# Patient Record
Sex: FEMALE | Race: BLACK OR AFRICAN AMERICAN
[De-identification: names, ages, dates, MRNs, and addresses within clinical notes are randomized per-mention and may not be internally consistent; named-entity substitution may affect disease eponyms.]

---

## 2020-08-27 ENCOUNTER — HOSPITAL ENCOUNTER (INPATIENT)
Dept: HOSPITAL 35 - ER | Age: 80
LOS: 5 days | Discharge: NURSING, REHAB OR LONG-TERM CARE | DRG: 871 | End: 2020-09-01
Attending: INTERNAL MEDICINE | Admitting: INTERNAL MEDICINE
Payer: COMMERCIAL

## 2020-08-27 VITALS
SYSTOLIC BLOOD PRESSURE: 179 MMHG | DIASTOLIC BLOOD PRESSURE: 73 MMHG | WEIGHT: 145 LBS | BODY MASS INDEX: 27.38 KG/M2 | HEIGHT: 60.98 IN

## 2020-08-27 DIAGNOSIS — G47.30: ICD-10-CM

## 2020-08-27 DIAGNOSIS — Z85.038: ICD-10-CM

## 2020-08-27 DIAGNOSIS — Z79.899: ICD-10-CM

## 2020-08-27 DIAGNOSIS — R63.4: ICD-10-CM

## 2020-08-27 DIAGNOSIS — B96.89: ICD-10-CM

## 2020-08-27 DIAGNOSIS — Z90.49: ICD-10-CM

## 2020-08-27 DIAGNOSIS — G47.00: ICD-10-CM

## 2020-08-27 DIAGNOSIS — I50.33: ICD-10-CM

## 2020-08-27 DIAGNOSIS — E11.51: ICD-10-CM

## 2020-08-27 DIAGNOSIS — Z79.4: ICD-10-CM

## 2020-08-27 DIAGNOSIS — E03.9: ICD-10-CM

## 2020-08-27 DIAGNOSIS — N39.0: ICD-10-CM

## 2020-08-27 DIAGNOSIS — Z88.8: ICD-10-CM

## 2020-08-27 DIAGNOSIS — E11.22: ICD-10-CM

## 2020-08-27 DIAGNOSIS — A41.9: Primary | ICD-10-CM

## 2020-08-27 DIAGNOSIS — E78.00: ICD-10-CM

## 2020-08-27 DIAGNOSIS — I13.0: ICD-10-CM

## 2020-08-27 DIAGNOSIS — N17.0: ICD-10-CM

## 2020-08-27 DIAGNOSIS — G92: ICD-10-CM

## 2020-08-27 DIAGNOSIS — D50.9: ICD-10-CM

## 2020-08-27 DIAGNOSIS — E53.8: ICD-10-CM

## 2020-08-27 DIAGNOSIS — N18.3: ICD-10-CM

## 2020-08-27 LAB
ANION GAP SERPL CALC-SCNC: 8 MMOL/L (ref 7–16)
BUN SERPL-MCNC: 57 MG/DL (ref 7–18)
CALCIUM SERPL-MCNC: 9.3 MG/DL (ref 8.5–10.1)
CHLORIDE SERPL-SCNC: 103 MMOL/L (ref 98–107)
CO2 SERPL-SCNC: 25 MMOL/L (ref 21–32)
CREAT SERPL-MCNC: 2.5 MG/DL (ref 0.6–1)
ERYTHROCYTE [DISTWIDTH] IN BLOOD BY AUTOMATED COUNT: 13.2 % (ref 10.5–14.5)
GLUCOSE SERPL-MCNC: 230 MG/DL (ref 74–106)
HCT VFR BLD CALC: 30.1 % (ref 37–47)
HGB BLD-MCNC: 10.3 GM/DL (ref 12–15)
MCH RBC QN AUTO: 31.9 PG (ref 26–34)
MCHC RBC AUTO-ENTMCNC: 34.3 G/DL (ref 28–37)
MCV RBC: 93 FL (ref 80–100)
PLATELET # BLD: 194 THOU/UL (ref 150–400)
POTASSIUM SERPL-SCNC: 5.1 MMOL/L (ref 3.5–5.1)
RBC # BLD AUTO: 3.23 MIL/UL (ref 4.2–5)
SODIUM SERPL-SCNC: 136 MMOL/L (ref 136–145)
WBC # BLD AUTO: 6.1 THOU/UL (ref 4–11)

## 2020-08-27 PROCEDURE — 10045: CPT

## 2020-08-28 VITALS — DIASTOLIC BLOOD PRESSURE: 83 MMHG | SYSTOLIC BLOOD PRESSURE: 179 MMHG

## 2020-08-28 VITALS — SYSTOLIC BLOOD PRESSURE: 147 MMHG | DIASTOLIC BLOOD PRESSURE: 61 MMHG

## 2020-08-28 VITALS — DIASTOLIC BLOOD PRESSURE: 68 MMHG | SYSTOLIC BLOOD PRESSURE: 171 MMHG

## 2020-08-28 VITALS — SYSTOLIC BLOOD PRESSURE: 143 MMHG | DIASTOLIC BLOOD PRESSURE: 52 MMHG

## 2020-08-28 LAB
ALBUMIN SERPL-MCNC: 3.3 G/DL (ref 3.4–5)
ALT SERPL-CCNC: 19 U/L (ref 30–65)
AST SERPL-CCNC: 18 U/L (ref 15–37)
BACTERIA-REFLEX: (no result) /HPF
BILIRUB DIRECT SERPL-MCNC: < 0.1 MG/DL
BILIRUB SERPL-MCNC: 0.3 MG/DL (ref 0.2–1)
BILIRUB UR-MCNC: NEGATIVE MG/DL
COLOR UR: YELLOW
EOSINOPHIL NFR BLD: 3 % (ref 0–3)
GRANULOCYTES NFR BLD MANUAL: 52 % (ref 36–66)
KETONES UR STRIP-MCNC: NEGATIVE MG/DL
LYMPHOCYTES NFR BLD AUTO: 32 % (ref 24–44)
MONOCYTES NFR BLD: 13 % (ref 1–8)
NEUTROPHILS # BLD: 3.2 THOU/UL (ref 1.4–8.2)
PLATELET # BLD EST: NORMAL 10*3/UL
PROT SERPL-MCNC: 7.3 G/DL (ref 6.4–8.2)
RBC # UR STRIP: (no result) /UL
RBC #/AREA URNS HPF: (no result) /HPF (ref 0–2)
RBC MORPH BLD: NORMAL
SP GR UR STRIP: 1.01 (ref 1–1.03)
URINE CLARITY: CLEAR
URINE GLUCOSE-RANDOM*: (no result)
URINE LEUKOCYTES-REFLEX: (no result)
URINE NITRITE-REFLEX: NEGATIVE
URINE PROTEIN (DIPSTICK): (no result)
URINE WBC-REFLEX: (no result) /HPF (ref 0–5)
UROBILINOGEN UR STRIP-ACNC: 0.2 E.U./DL (ref 0.2–1)

## 2020-08-29 VITALS — SYSTOLIC BLOOD PRESSURE: 106 MMHG | DIASTOLIC BLOOD PRESSURE: 39 MMHG

## 2020-08-29 VITALS — DIASTOLIC BLOOD PRESSURE: 47 MMHG | SYSTOLIC BLOOD PRESSURE: 133 MMHG

## 2020-08-29 VITALS — DIASTOLIC BLOOD PRESSURE: 51 MMHG | SYSTOLIC BLOOD PRESSURE: 118 MMHG

## 2020-08-29 VITALS — SYSTOLIC BLOOD PRESSURE: 87 MMHG | DIASTOLIC BLOOD PRESSURE: 32 MMHG

## 2020-08-29 LAB
ANION GAP SERPL CALC-SCNC: 8 MMOL/L (ref 7–16)
BUN SERPL-MCNC: 49 MG/DL (ref 7–18)
CALCIUM SERPL-MCNC: 9.1 MG/DL (ref 8.5–10.1)
CHLORIDE SERPL-SCNC: 106 MMOL/L (ref 98–107)
CO2 SERPL-SCNC: 26 MMOL/L (ref 21–32)
CREAT SERPL-MCNC: 2.1 MG/DL (ref 0.6–1)
ERYTHROCYTE [DISTWIDTH] IN BLOOD BY AUTOMATED COUNT: 13.4 % (ref 10.5–14.5)
GLUCOSE SERPL-MCNC: 39 MG/DL (ref 74–106)
HCT VFR BLD CALC: 29 % (ref 37–47)
HGB BLD-MCNC: 9.6 GM/DL (ref 12–15)
MAGNESIUM SERPL-MCNC: 1.7 MG/DL (ref 1.8–2.4)
MCH RBC QN AUTO: 30.9 PG (ref 26–34)
MCHC RBC AUTO-ENTMCNC: 33 G/DL (ref 28–37)
MCV RBC: 93.6 FL (ref 80–100)
PLATELET # BLD: 205 THOU/UL (ref 150–400)
POTASSIUM SERPL-SCNC: 4.1 MMOL/L (ref 3.5–5.1)
RBC # BLD AUTO: 3.1 MIL/UL (ref 4.2–5)
SODIUM SERPL-SCNC: 140 MMOL/L (ref 136–145)
WBC # BLD AUTO: 5.6 THOU/UL (ref 4–11)

## 2020-08-30 VITALS — DIASTOLIC BLOOD PRESSURE: 56 MMHG | SYSTOLIC BLOOD PRESSURE: 121 MMHG

## 2020-08-30 VITALS — DIASTOLIC BLOOD PRESSURE: 53 MMHG | SYSTOLIC BLOOD PRESSURE: 140 MMHG

## 2020-08-30 VITALS — DIASTOLIC BLOOD PRESSURE: 51 MMHG | SYSTOLIC BLOOD PRESSURE: 135 MMHG

## 2020-08-31 ENCOUNTER — HOSPITAL ENCOUNTER (INPATIENT)
Dept: HOSPITAL 35 - REHABU | Age: 80
LOS: 17 days | Discharge: HOME HEALTH | DRG: 91 | End: 2020-09-17
Attending: PHYSICAL MEDICINE & REHABILITATION | Admitting: PHYSICAL MEDICINE & REHABILITATION
Payer: COMMERCIAL

## 2020-08-31 VITALS — SYSTOLIC BLOOD PRESSURE: 118 MMHG | DIASTOLIC BLOOD PRESSURE: 40 MMHG

## 2020-08-31 VITALS — HEIGHT: 61 IN | BODY MASS INDEX: 24.52 KG/M2 | WEIGHT: 129.9 LBS

## 2020-08-31 VITALS — DIASTOLIC BLOOD PRESSURE: 56 MMHG | SYSTOLIC BLOOD PRESSURE: 132 MMHG

## 2020-08-31 VITALS — DIASTOLIC BLOOD PRESSURE: 52 MMHG | SYSTOLIC BLOOD PRESSURE: 141 MMHG

## 2020-08-31 DIAGNOSIS — D50.9: ICD-10-CM

## 2020-08-31 DIAGNOSIS — N18.3: ICD-10-CM

## 2020-08-31 DIAGNOSIS — G92: Primary | ICD-10-CM

## 2020-08-31 DIAGNOSIS — R53.81: ICD-10-CM

## 2020-08-31 DIAGNOSIS — N39.0: ICD-10-CM

## 2020-08-31 DIAGNOSIS — E11.22: ICD-10-CM

## 2020-08-31 DIAGNOSIS — A41.9: ICD-10-CM

## 2020-08-31 LAB
ANION GAP SERPL CALC-SCNC: 9 MMOL/L (ref 7–16)
APTT BLD: 30.8 SECONDS (ref 24.5–32.8)
BUN SERPL-MCNC: 57 MG/DL (ref 7–18)
CALCIUM SERPL-MCNC: 8.6 MG/DL (ref 8.5–10.1)
CHLORIDE SERPL-SCNC: 105 MMOL/L (ref 98–107)
CO2 SERPL-SCNC: 25 MMOL/L (ref 21–32)
CREAT SERPL-MCNC: 3.1 MG/DL (ref 0.6–1)
ERYTHROCYTE [DISTWIDTH] IN BLOOD BY AUTOMATED COUNT: 13 % (ref 10.5–14.5)
GLUCOSE SERPL-MCNC: 138 MG/DL (ref 74–106)
HCT VFR BLD CALC: 27.2 % (ref 37–47)
HGB BLD-MCNC: 9.1 GM/DL (ref 12–15)
INR PPP: 1
MCH RBC QN AUTO: 31.5 PG (ref 26–34)
MCHC RBC AUTO-ENTMCNC: 33.6 G/DL (ref 28–37)
MCV RBC: 93.7 FL (ref 80–100)
PLATELET # BLD: 167 THOU/UL (ref 150–400)
POTASSIUM SERPL-SCNC: 4.4 MMOL/L (ref 3.5–5.1)
PROTHROMBIN TIME: 10 SECONDS (ref 9.3–11.4)
RBC # BLD AUTO: 2.9 MIL/UL (ref 4.2–5)
SODIUM SERPL-SCNC: 139 MMOL/L (ref 136–145)
WBC # BLD AUTO: 5.1 THOU/UL (ref 4–11)

## 2020-08-31 PROCEDURE — 10112: CPT

## 2020-09-01 VITALS — SYSTOLIC BLOOD PRESSURE: 146 MMHG | DIASTOLIC BLOOD PRESSURE: 60 MMHG

## 2020-09-01 VITALS — DIASTOLIC BLOOD PRESSURE: 64 MMHG | SYSTOLIC BLOOD PRESSURE: 165 MMHG

## 2020-09-01 VITALS — DIASTOLIC BLOOD PRESSURE: 65 MMHG | SYSTOLIC BLOOD PRESSURE: 164 MMHG

## 2020-09-01 LAB
ALBUMIN SERPL-MCNC: 2.8 G/DL (ref 3.4–5)
ALT SERPL-CCNC: 50 U/L (ref 30–65)
ANION GAP SERPL CALC-SCNC: 9 MMOL/L (ref 7–16)
ANISOCYTOSIS BLD QL SMEAR: SLIGHT
AST SERPL-CCNC: 30 U/L (ref 15–37)
BASOPHILS NFR BLD AUTO: 1 % (ref 0–2)
BILIRUB SERPL-MCNC: 0.4 MG/DL (ref 0.2–1)
BUN SERPL-MCNC: 53 MG/DL (ref 7–18)
CALCIUM SERPL-MCNC: 8.6 MG/DL (ref 8.5–10.1)
CHLORIDE SERPL-SCNC: 105 MMOL/L (ref 98–107)
CO2 SERPL-SCNC: 25 MMOL/L (ref 21–32)
CREAT SERPL-MCNC: 2.8 MG/DL (ref 0.6–1)
EOSINOPHIL NFR BLD: 2 % (ref 0–3)
ERYTHROCYTE [DISTWIDTH] IN BLOOD BY AUTOMATED COUNT: 13.2 % (ref 10.5–14.5)
GLUCOSE SERPL-MCNC: 196 MG/DL (ref 74–106)
GRANULOCYTES NFR BLD MANUAL: 70 % (ref 36–66)
HCT VFR BLD CALC: 26.5 % (ref 37–47)
HGB BLD-MCNC: 9 GM/DL (ref 12–15)
LYMPHOCYTES NFR BLD AUTO: 20 % (ref 24–44)
MAGNESIUM SERPL-MCNC: 1.8 MG/DL (ref 1.8–2.4)
MCH RBC QN AUTO: 31.8 PG (ref 26–34)
MCHC RBC AUTO-ENTMCNC: 34.2 G/DL (ref 28–37)
MCV RBC: 93.1 FL (ref 80–100)
MONOCYTES NFR BLD: 7 % (ref 1–8)
NEUTROPHILS # BLD: 4.7 THOU/UL (ref 1.4–8.2)
NEUTS BAND NFR BLD: 0 % (ref 0–8)
PLATELET # BLD: 180 THOU/UL (ref 150–400)
POIKILOCYTOSIS BLD QL SMEAR: SLIGHT
POTASSIUM SERPL-SCNC: 3.9 MMOL/L (ref 3.5–5.1)
PROT SERPL-MCNC: 6.4 G/DL (ref 6.4–8.2)
RBC # BLD AUTO: 2.84 MIL/UL (ref 4.2–5)
SODIUM SERPL-SCNC: 139 MMOL/L (ref 136–145)
WBC # BLD AUTO: 6.7 THOU/UL (ref 4–11)

## 2020-09-01 NOTE — NUR
chart review. consult for dcp. cm visited with pt while on 4w prior to being
moved to acute rehab today. pt able to make her needs know, pleasant with
some forgetfulness. noted per chart, lives at home with daughter fausto and
other family. have ramp, walker and wheel chair. grab bars, lift alert and
prosthesis .  able to feed her self. had hh in past, skilled rehab and been to
5n acute rehab in past. will cont following as needed for dc needs.

## 2020-09-01 NOTE — NUR
ASSUMED CARE AT 1645. PT IS A NEW ADMISSION CAME UP WITH 2 HOSPITAL PERSONNEL
ON A BED. PT IS ALERT AND ORIENTATED X 4. ON ROOM AIR. PT REPORTED OCCASIONAL
PRODUCTIVE COUGH AND HAS SMALL TO MEDIUM CLEAR SPUTUM. DENIES ANY SHORT OF
BREATH OR CHILLS. TESTED NEGATIVE FOR COVID PER PT AND DAUGHTER. PT HAD A
DAVID AND WAS REMOVED PRIOR TO ADMISSION. PT HAS OCCASIONAL BLADDER AND BOWEL
INCONTINENCE. LUNGS CLEAR TO AUSCULTATION, BOWEL SOUNDS PRESENT, LAST BM WAS
ON 9/1/20. NO IV ACCESS. SKIN IS WARM TO TOUCH AND NO BREAKDOWN NOTED. PER
DAUGHTER PT APPETITE IS FAIR AND HAS LOST 10LBS IN THE LAST 3 MONTHS. DAUGHTER
EXPRESSED CONCERNS FOR PT'S MENTATION WHICH IS SPORADIC WHICH SOMETIMES SHE
CAN BE CONFUSED AND FORGETFUL AND DOES NOT MAKE ANY SENSE, THIS TYPE OF
BEHAVIOUR IS MORE APPARENT SINCE HER INFECTION (UTI). PER THIS RN ASSESSMENT,
PT SEEMS APPEARS ORIENTATED AND ABLE TO ANSWER MEMORY QUESTIONS. PT USES
WALKER AND MOSTLY WC AT HOME AND LIVES WITH DAUGHTER AND GRAND KIDS. SHE HAD A
R BKA IN 2016 AND HAS A PROSTHESIS. PT IS HOPING TO INCREASE STRENGTH AND
ENDURANCE AND ABLE TO WALK AND BE STRONGER. PHYSICAL THERAPY, OCCUPATIONAL
THERAPY AND SPEECH WILL EVAL AND TREAT.  CONT TO MONITOR.

## 2020-09-02 VITALS — SYSTOLIC BLOOD PRESSURE: 187 MMHG | DIASTOLIC BLOOD PRESSURE: 76 MMHG

## 2020-09-02 VITALS — SYSTOLIC BLOOD PRESSURE: 142 MMHG | DIASTOLIC BLOOD PRESSURE: 61 MMHG

## 2020-09-02 LAB
ANION GAP SERPL CALC-SCNC: 10 MMOL/L (ref 7–16)
BUN SERPL-MCNC: 57 MG/DL (ref 7–18)
CALCIUM SERPL-MCNC: 8.6 MG/DL (ref 8.5–10.1)
CHLORIDE SERPL-SCNC: 107 MMOL/L (ref 98–107)
CO2 SERPL-SCNC: 25 MMOL/L (ref 21–32)
CREAT SERPL-MCNC: 3 MG/DL (ref 0.6–1)
ERYTHROCYTE [DISTWIDTH] IN BLOOD BY AUTOMATED COUNT: 12.8 % (ref 10.5–14.5)
FOLATE SERPL-MCNC: 8.7 NG/ML (ref 8.6–58.9)
GLUCOSE SERPL-MCNC: 104 MG/DL (ref 74–106)
HCT VFR BLD CALC: 24.9 % (ref 37–47)
HGB BLD-MCNC: 8.3 GM/DL (ref 12–15)
MCH RBC QN AUTO: 31.1 PG (ref 26–34)
MCHC RBC AUTO-ENTMCNC: 33.4 G/DL (ref 28–37)
MCV RBC: 93.1 FL (ref 80–100)
PLATELET # BLD: 184 THOU/UL (ref 150–400)
POTASSIUM SERPL-SCNC: 3.8 MMOL/L (ref 3.5–5.1)
RBC # BLD AUTO: 2.68 MIL/UL (ref 4.2–5)
SODIUM SERPL-SCNC: 142 MMOL/L (ref 136–145)
VIT B12 SERPL-MCNC: 709 PG/ML (ref 193–986)
WBC # BLD AUTO: 5.8 THOU/UL (ref 4–11)

## 2020-09-02 NOTE — NUR
PATIENT WAS AWAKE IN BED WHEN CARE ASSUMED. PATIENT IS ALERT AND ORIENTED X
3-4, CAN BE FORGETFUL AT TIMES. PATIENT ASSISTED UP IN WHEELCHAIR BY THERAPY.
PATIENT IS CALM COOPERATIVE WITH CARE. PATIENT TOOK ALL MEDICATION WHOLE, ONE
AT A TIME THIS MORNING. SHE IS ABLE TO FEED SELF, EATING, AND DRINKING FLUID
WELL. VISIBLE TREMORS NOTED TO DAKSHA HANDS PATIENT'S BLOOD SUGAR THIS MORNING
WITH RESULT , NO INSULIN REQUIRED. AT LUNCH TIME, BLOOD SUGAR 
3UNIT INSLIN GIVEN PER SLIDING SCALE ORDER. AT SUPPER, BLOOD SUGAR WITH RESULT
OF 35, 2 CUPS APPLE JUICE, 3 GLUCOSE CHEWABLE TABLES GIVEN, BLOOD SUGAR
RECHECKED, WITH RESULT OF 80.  PATIENT DENIES BLURRED VISION, CONFUSION, OR
LIGHT HEADEDNESS. NO SWEATING NOTED. PATIENT RESPONDING APPROPRIATELY TO
ASSESSMENT QUESTIONS. DR. MCLEAN NOTIFIED, NO NEW ORDER NOTED AT THIS TIME. DR. MCLEAN STATES HE WILL LOOK INTO PATIENT'S INSULIN ORDERS. PATIENT CURRENTLY
SITTING UP IN WHEEL CHAIR EATING SUPPER. PATIENT DAUGHTER AT BEDSIDE.
PATIENT'S DAUGHTER -DPOA (RUSTAM CAVANAUGH) STATES THAT PATIENT WAS NOT ABLE TO
TASTE FOOD PRIOR TO ADMISSION, THAT SHE HAS MENTIONED IT BEFORE, NOT SURE IT
WAS WRITTERN DOWN OR NOT. RUSTAM ALSO STATES THAT PATIENT JUST STARTED CRYING
FOR NO APPERENT REASON, AND "THAT IS NOT NORMAL FOR HER".  NO SIGN OF ACUTE
DISTRESS NOTED AT THIS TIME, WILL MONITOR FOR SAFETY.

## 2020-09-02 NOTE — NUR
ON-GOING ASSESSMENT: CM REVIEWED CHART AND RECEIVED CALL FROM TEO AT
"[facility]"  WHO STATES "PT IS IN SERVICE WITH THEM FOR HOME HEALTH AND THEY
CAN ACCEPT HER BACK ON SERVICE WHEN PATIENT DISCHARGES HOME" TEO. CM
NOTIFIED DISCHARGE PLANNER TO PLEASE FAX REFERRAL TO "[facility]"  FOR THEM TO
FOLLOW ALONG. CM WILL CONTINUE TO FOLLOW TO ASSIST AS NEEDED.

## 2020-09-02 NOTE — NUR
ASSUMED CARE OF PT AT 1915 ON 09/01/20. PT IS A&OX4. IS ON ROOM AIR. IS
STABLE. DENIES PIAN. HAS RLE BKA WITH PROSTATIC WHEN AMBULATING. IS UP WITH
MAX ASSIST OF 2, GB, WALKER. FALL PRECAUTIONS & HOURLY ROUNDING CONTINUED THIS
SHIFT. PT HAS BLISTER ON LEFT HAND THAT WAS PRESENT AT ADMISSION ACCORDING TO
DAY SHIFT NURSE. PT IS TURNED Q2H. USES FEMALE EXTERNAL CATH AT HS. PT IS
CURRENTLY SLEEPING. CALL LIGHT WITHIN REACH. WILL CONTNUE TO MONITOR.

## 2020-09-03 VITALS — DIASTOLIC BLOOD PRESSURE: 65 MMHG | SYSTOLIC BLOOD PRESSURE: 152 MMHG

## 2020-09-03 VITALS — SYSTOLIC BLOOD PRESSURE: 124 MMHG | DIASTOLIC BLOOD PRESSURE: 54 MMHG

## 2020-09-03 LAB
BACTERIA-REFLEX: (no result) /HPF
BILIRUB UR-MCNC: NEGATIVE MG/DL
COLOR UR: YELLOW
KETONES UR STRIP-MCNC: NEGATIVE MG/DL
RBC # UR STRIP: (no result) /UL
SP GR UR STRIP: 1.02 (ref 1–1.03)
SQUAMOUS: (no result) /LPF (ref 0–3)
URINE CLARITY: CLEAR
URINE GLUCOSE-RANDOM*: NEGATIVE
URINE LEUKOCYTES-REFLEX: (no result)
URINE NITRITE-REFLEX: NEGATIVE
URINE PROTEIN (DIPSTICK): (no result)
URINE WBC-REFLEX: (no result) /HPF (ref 0–5)
UROBILINOGEN UR STRIP-ACNC: 0.2 E.U./DL (ref 0.2–1)

## 2020-09-03 NOTE — NUR
anika with [facility] called and left message requesting a call back. cm called
her back, she passed on that radha on service with [facility] and will
follow if needs hh when dc. will cont following as needed for dc needs.

## 2020-09-03 NOTE — NUR
ASSUMED CARE AT 0700. PT IS AWAKE, ALERT AND ORIENTATED TO SELF, PLACE AND
YEAR. PLEASANT AND FORGETFUL. MAX ASSIST WITH AMBULATION. BG 91, INSULIN AND
TRADJENTA HELD. SPOKE TO DAUGHTER, YULISA, USUALLY INSULIN NOT GIVEN IF BG <
200. PT ATE ALMOST 60% OF HER BREAKFAST TODAY. DENIES ANY PAIN OR DISCOMFORT.
SHE REPORTED SHE SLEPT WELL LAST NIGHT. HAD A LOOSE BOWEL MOVEMENT EARLIER
TODAY. PT IS INCONTINENCE OF BLADDER AND ORDERS RECEIVED TO STRAIGHT CATH AND
COLLECT A UA. DAUGHTER IS ALSO CONCERNED ABOUT HER MOTHER'S PROGRESSIVELY
GETTING WORSE IN TERMS OF COGNITION. PT TOLD HER DAUGHTER SHE HAS LOSS OF
TASTE AND WAS INITIALLY TESTED NEGATIVE FOR COVID. PT IS AFEBRILE, HAS LOOSE
COUGH, DENIES ANY SHORT OF AIR. CONCERNS ADDRESSED WITH EDILIA NP. PT IS SLOW
PROGRESSION WITH THERAPY DUE TO PHYSICAL AND COGNITION IMPAIRMENT. CONT TO
MONITOR.

## 2020-09-03 NOTE — NUR
PT ALERT AND ORIENTED X 2, CONFUSED AT TIMES.  INCONT OF URINE AND STOOL.
BLOOD SUGAR 168 AT HS.  NO INSULIN GIVEN PER REQUEST OF PT'S DAUGHTER.  PT
DENIES PAIN OR DISCOMFORT.  BED ALARM ON FOR SAFETY.  PT CHECKED ON HOURLY
ROUNDS.

## 2020-09-04 VITALS — DIASTOLIC BLOOD PRESSURE: 62 MMHG | SYSTOLIC BLOOD PRESSURE: 128 MMHG

## 2020-09-04 VITALS — DIASTOLIC BLOOD PRESSURE: 70 MMHG | SYSTOLIC BLOOD PRESSURE: 133 MMHG

## 2020-09-04 LAB
EST. AVERAGE GLUCOSE BLD GHB EST-MCNC: 148 MG/DL
GLYCOHEMOGLOBIN (HGB A1C): 6.8 % (ref 4.8–5.6)

## 2020-09-04 NOTE — NUR
NM GIVEN REPORT THAT PATIENT'S DAUGHTER CALLED A MEMBER OF THE PATIENT
EXPERIENCE TEAM EARLIER TODAY TO VOICE CONCERNS/QUESTIONS REGARDING THE
FOLLOWIN. SHE DID NOT WANT THE PATIENT TO GET ORANGE JUICE OR VITAMIN C DUE TO THE
PATIENT'S HX OF ELEVATED CREATININE AND RENAL ISSUES.
2. SHE DID NOT WANT THE PATIENT TO GET INSULIN IF HER FSBS IS LESS THAN 200.
3. SHE WANTED TO KNOW ALL OF THE MEDICATIONS THAT THE PATIENT IS CURRENTLY
TAKING.
4. SHE WANTED A CALL BACK REGARDING HER CONCERNS.
ALL OF THIS INFORMATION WAS NOTED TO SIDDHARTH CISNEROS NP, WHO PROMPTLY CALLED
THE DAUGHTER TO ALLEVIATE HER CONCERNS AND ADDRESS ALL NEEDS. JAGJIT EASTON, WAS
NOT PRESENT AT THE  THAT IS IN [employer], AND SIDDHARTH LEFT A MESSAGE. AWAITING
A CALL BACK FROM THE FAMILY. NM CALLING DTR AGAIN TO TRY TO CONNECT. NOTED
THAT ORDERS WERE PLACED FOR THE DIET CHANGES TO REMOVE ORANGE JUICE, VITAMIN C
WAS DISCONTINUED, AND A CALL WAS PLACED TO DR. MCLEAN TO REVIEW THE SSI ORDERS
AND CONSIDER A CONSULT FOR ENDOCRINOLOGY.

## 2020-09-04 NOTE — NUR
delia spoke with daughter fausto. daughter voiced many concerns. cm passed on to
np. she stated she did not mean to cuss at  but how many times to have to
repeat myself, thank you though for calling i will be up in hr or so and would
like to talk with topher cadena if i can my more is confused more than ever and
told be she getting  tomorrow to man not talk with in years and mom
says she is not sleeping per fausto. delia spoke with np and passed on
information. will cont following as needed for dc needs.

## 2020-09-04 NOTE — NUR
CALLED CONSULTS TO DR COFFEY NEPHROLOG[facility] [phone] AND DR DE LEON
ENDOCRINOLGY [phone] AT 19:45 SPOKE ANSWERING SERVIVE STAFF. CALLED DR OSHEA EARLIER IN SHIFT [phone] FOR CONSULT SHE HAS NEVER CALLED BACK.
DAUGHTER HAS CONCERNED THAT THIS NURSE HAS PASSED ON. KCL TO BE CHARTED IN AM.

## 2020-09-04 NOTE — NUR
PATIENT AOX1 CONFUSED AND FORGETFUL. PATIENT SEEM TO BE TALKING TO UNSEEN
OTHER.  PATIENT EPISODE OF AGITATION AT TIMES. PATIENT ENCOURAGED
FLUIDS.PATIENT NEEDS MAXIMUM ASSISTANCE WITH ADL, BED MOBILITY, TRANSFER AND
TOILETING. DAVID CATH DONE THIS SHIFT. PATIENT HAS BEEN UP OFF AND ON,THIS
SHIFT DEMANDING FOR MORE SLEEPING MEDS. PATIENT IN BED ASLEEP AT THIS TIME
BREATHING REGULAR AND UNLABOURED.

## 2020-09-05 VITALS — SYSTOLIC BLOOD PRESSURE: 153 MMHG | DIASTOLIC BLOOD PRESSURE: 70 MMHG

## 2020-09-05 VITALS — SYSTOLIC BLOOD PRESSURE: 129 MMHG | DIASTOLIC BLOOD PRESSURE: 63 MMHG

## 2020-09-05 LAB
ANION GAP SERPL CALC-SCNC: 11 MMOL/L (ref 7–16)
BASOPHILS NFR BLD AUTO: 1 % (ref 0–2)
BUN SERPL-MCNC: 74 MG/DL (ref 7–18)
CALCIUM SERPL-MCNC: 8.8 MG/DL (ref 8.5–10.1)
CHLORIDE SERPL-SCNC: 103 MMOL/L (ref 98–107)
CO2 SERPL-SCNC: 25 MMOL/L (ref 21–32)
CREAT SERPL-MCNC: 2.9 MG/DL (ref 0.6–1)
EOSINOPHIL NFR BLD: 1 % (ref 0–3)
ERYTHROCYTE [DISTWIDTH] IN BLOOD BY AUTOMATED COUNT: 13.1 % (ref 10.5–14.5)
GLUCOSE SERPL-MCNC: 168 MG/DL (ref 74–106)
GRANULOCYTES NFR BLD MANUAL: 64 % (ref 36–66)
HCT VFR BLD CALC: 26.4 % (ref 37–47)
HGB BLD-MCNC: 9 GM/DL (ref 12–15)
LYMPHOCYTES NFR BLD AUTO: 24 % (ref 24–44)
MAGNESIUM SERPL-MCNC: 2 MG/DL (ref 1.8–2.4)
MCH RBC QN AUTO: 31.6 PG (ref 26–34)
MCHC RBC AUTO-ENTMCNC: 34.1 G/DL (ref 28–37)
MCV RBC: 92.8 FL (ref 80–100)
MONOCYTES NFR BLD: 10 % (ref 1–8)
NEUTROPHILS # BLD: 5.3 THOU/UL (ref 1.4–8.2)
PLATELET # BLD EST: NORMAL 10*3/UL
PLATELET # BLD: 225 THOU/UL (ref 150–400)
POTASSIUM SERPL-SCNC: 4.4 MMOL/L (ref 3.5–5.1)
RBC # BLD AUTO: 2.84 MIL/UL (ref 4.2–5)
RBC MORPH BLD: NORMAL
SODIUM SERPL-SCNC: 139 MMOL/L (ref 136–145)
WBC # BLD AUTO: 8.3 THOU/UL (ref 4–11)

## 2020-09-05 NOTE — NUR
ASSUMED CARE OF PT AT 0700. PT IN AM ANSWERING QUESTIONS APPROPRIATELY, BUT BY
LUNCH TIME BECAME AGITATED AND ALERT AND ORIENTED TO PERSON ONLY. PT MAKING
STATEMENTS SUCH AS "I'M GETTING  TODAY", "OPERA IS COMING TO MY
WEDDING", "I NEED TO CALL THE [facility]". PT MAKING ATTEMTS TO CALL PHONE NUMBERS
ON TELEVISION, PHONE DISCONNECTED WITH PERMISSION FROM DAUGHTER. DAUGHTER AT
BEDSIDE IN EVENING, DISCUSSED HER CONCERNS ABOUT PT CONFUSION AND HOW TO BEST
ADDRESS IT. INFORMED DAUGHTER TO ATTEMPT TO DISTRACT PT AND REDIRECT HER WHEN
FIXATING ON DELUSIONS. PT BECAME AGGITATED AT APPROX 1800 AND PO MEDICATIONS
ADMINISTERED TO HELP PT CALM DOWN. ACCU CHECKS ACHS. DAVID CATHETER IN PLACE
AND DRAINING APPROPRIATELY. FALL PRECAUTIONS IN PLACE AND NURSING WILL
CONTINUE TO MONITOR.

## 2020-09-05 NOTE — NUR
PT ALERT, CONFUSED.  DAVID PATENT DRAINING CLEAR YELLOW URINE.  PT TOOK HS
MEDS ONE AT A TIME WITHOUT DIFFICULTY.  BLOOD SUGAR 224 AT HS.  PT DENIES PAIN
OR DISCOMFORT.  BED ALARM ON FOR SAFETY.  PT HAS BEEN SLEEPING SINCE MEDS
GIVEN AT HS.

## 2020-09-06 VITALS — DIASTOLIC BLOOD PRESSURE: 72 MMHG | SYSTOLIC BLOOD PRESSURE: 158 MMHG

## 2020-09-06 VITALS — DIASTOLIC BLOOD PRESSURE: 62 MMHG | SYSTOLIC BLOOD PRESSURE: 140 MMHG

## 2020-09-06 NOTE — NUR
PT ALERT TO SELF ONLY. VSS. DAVID TO DD, PT DENIES PAIN/SOA. PT TOLERATES MEDS
AND MEALS. PT UP SITTING IN WC FOR SOME PART OF THE DAY. PT DAUGHTER AT
BEDSIDE. WILL CONTINUE TO MONITOR.

## 2020-09-06 NOTE — NUR
ASSUMED CARE OF PT AT 1915. PT IS A&O TO SELF & PLACE. IS CONFUSED. DENIES
PAIN. IS ON ROOM AIR. HAS RBKA. PROSTHETIC IN ROOM. IS UP WITH MAX ASSIST OF
2, GB, WALKER. FALL PRECAUTIONS & HOURLY ROUNDING CONTINUED THIS SHIFT. LABS &
VITALS REVIEWED. PT HAS OLD BLISTER ON LEFT HAND GARRETT. DAVID IN PLACE. PT IS
STABLE. PT IS CURRENTLY IN BED SLEEPING. CALL LIGHT WITHIN REACH. WILL
CONTINUE TO MONITOR.

## 2020-09-06 NOTE — NUR
ASSUMED CARE OF PT AT 1900HRS. PT AOX2 AND CONFUSED AT THE TIME OF ASSESSMENT.
FALL PRECAUTION IN PLACE. DAVID IN PLACE AND IS PATIENT. SNACKS PROVIDED TO
PT. PT DENIES PAIN, NAUSEA OR SOA. ASSESSMENT AS CHARTED. PT WAS ABLE TO TAKE
ALL MEDS WHOLE WITH WATER. PT WAS ABLE TO GET COMFORTABLE AND SLEEP PART OF
THE SHIFT. VSS AND NO S/S OF ACUTE DISTRESS. WILL CONTINUE TO MONITOR FOR
CHANGES.

## 2020-09-06 NOTE — HC
[facility]
[address]
[place]   [address]                     CONSULTATION                  
_______________________________________________________________________________
 
Name:       DANYELLE JAMES              Room #:         514-P       [facility] [place]  
M.R.#:      [id number]                       Account #:      [id number]  
Admission:  09/01/20    Attend Phys:    Cal Harvey MD 
Discharge:              Date of Birth:  [birth year]  
                                                          Report #: [id number]
                                                                    [id number]   
_______________________________________________________________________________
THIS REPORT FOR:  
 
cc:  Radha Coello MD,Zaheer Mcneal MD, MD                                         ~
CC: Radha Harvey
 
DATE OF SERVICE:  09/05/2020
 
 
ENDOCRINE CONSULTATION NOTE
 
CONSULTING PHYSICIAN:  Dr. Harvey.
 
REASON FOR CONSULTATION:  Type 2 diabetes mellitus.
 
HISTORY OF PRESENT ILLNESS:  This is an 80-year-old female patient whose medical
background is significant for longstanding diabetes mellitus type 2.  The
patient's course has been complicated by chronic kidney disease along with
peripheral vascular and arterial disease that resulted in a right BKA in the
past.  Also, she is noted to have hypertension and iron deficiency anemia.
 
The patient was admitted to [facility] due to progressive
generalized weakness in the context of a UTI on 08/28/2020.  Following a period
of medical stabilization, the patient was admitted to the rehab unit given her
overall functional decline.
 
The patient maintains that she has been most recently utilizing Lantus insulin
12 units daily, in addition to NovoLog insulin 15 units with meals.  She notes
that her blood glucose control has been under excellent conditions and that she
has not experienced hypoglycemia.
 
Also, the patient is known to have hypothyroidism and is maintained on
levothyroxine 75 mcg daily.  She is also hypertensive and is maintained on
metoprolol 25 mg b.i.d. and amlodipine 2.5 mg daily.
 
REVIEW OF SYSTEMS:
CONSTITUTIONAL:  Generalized weakness, tiredness, but not fever or chills or
body weight changes.
HEENT:  Negative for sore throat, sinus pain, ear drainage.
PULMONARY:  Occasional shortness of breath and cough, but not hemoptysis.
CARDIAC:  Negative for chest pain, palpitations, syncope or presyncope.
GASTROINTESTINAL:  Negative for nausea, vomiting or significant changes in bowel
movement frequency.
NEUROLOGY:  Negative for loss of consciousness, seizure activity or frequent
 
 
 
[facility]
[address]
[place]   [address]                     CONSULTATION                  
_______________________________________________________________________________
 
Name:       DANYELLE JAMES              Room #:         514-P       [facility] [place]  
..#:      [id number]                       Account #:      [id number]  
Admission:  09/01/20    Attend Phys:    Cal Harvey MD 
Discharge:              Date of Birth:  [birth year]  
                                                          Report #: [id number]
                                                                    [id number]   
_______________________________________________________________________________
severe headaches.
DERMATOLOGY:  Negative for rash, ulceration, discoloration or other major
abnormalities.  Otherwise, review of systems noncontributory other than those
mentioned in HPI.
 
PAST MEDICAL HISTORY:
1.  Type 2 diabetes mellitus.
2.  Hypertension.
3.  Hypothyroidism.
4.  Hyperlipidemia.
5.  CHF.
6.  Chronic kidney disease stage 3-4.
7.  Peripheral arterial disease, status post right BKA, status post left lower
extremity stent placement.
8.  Colon cancer, status post colectomy in 2017.
9.  Recent UTI and pyelonephritis.
10.  Glaucoma.
 
ACTIVE MEDICATIONS:  Amlodipine 2.5 mg daily, ascorbic acid 500 mg daily,
cyanocobalamin 500 mg daily, Lantus insulin 12 units daily, loratadine 10 mg
daily, levothyroxine 75 mcg daily, latanoprost eyedrops, metoprolol 25 mg
b.i.d., sodium bicarbonate 650 mg b.i.d., trazodone 75 mg at bedtime, budesonide
0.5 mg b.i.d., ferrous sulfate 325 mg b.i.d. with meals, torsemide 20 mg.
 
ALLERGIES:  IRON and ORANGE JUICE.
 
FAMILY HISTORY:  Noncontributory.
 
SOCIAL HISTORY:  The patient lives with her daughter and grandchildren.
 
PHYSICAL EXAMINATION:
GENERAL:  Pleasant -American female patient who is not in apparent pain
or distress.
VITAL SIGNS:  Blood pressure is 153/70 mmHg, heart rate is 89 beats per minute,
respirations 16 per minute, temperature 36.8 degrees Celsius.
CONSTITUTIONAL:  The patient is sitting upright in bed.  She appears
comfortable, not in apparent distress.
HEENT:  Anicteric sclerae.  Intact extraocular motions.
NECK:  Supple, without JVD, carotid bruits or thyromegaly.
CHEST:  Noted for moderate air entry bilaterally with scattered rales and
rhonchi.
HEART:  Regular rate and rhythm without murmurs or gallops.
ABDOMEN:  Soft, lax.  No guarding.  Active bowel sounds.
EXTREMITIES:  Lower extremity exam is noted for a right BKA.  Trace ankle edema
on the left lower extremity.
NEUROLOGIC:  Awake, alert, but disoriented, confused a bit, moved all
 
 
 
[address]   [address]                     CONSULTATION                  
_______________________________________________________________________________
 
Name:       DANYELLE JAMES              Room #:         514-P       [facility] [place]  
M.R.#:      [id number]                       Account #:      [id number]  
Admission:  09/01/20    Attend Phys:    Cal Harvey MD 
Discharge:              Date of Birth:  [birth year]  
                                                          Report #: [id number]
                                                                    [id number]   
_______________________________________________________________________________
extremities spontaneously.
PSYCHIATRIC:  Pleasant, interactive.  Normal mood and affect, but distorted
thought content.  Specifically, the patient told me towards the end of our
interview that her wedding was going to take place later today.
 
LABORATORY DATA:  Blood glucose values over the past 48 hours have ranged
between 121-224 mg/dL, but have predominantly been under 180 mg/dL.  Otherwise,
sodium 139, potassium 4.4, chloride 103, CO2 of 25, anion gap 11, BUN 74,
creatinine 2.9 and reached as high as 3.6 in 03/2020, glucose 168, AST 30, total
bilirubin 0.4, calcium 8.8, magnesium 2.0, alkaline phosphatase 152, ALT 50,
total protein 6.4, albumin 2.8, EGFR 19.  Lactic acid 0.6.  Total CPK 48,
troponin negative.  BNP 2811.  INR 1.  White blood count 8.3, hemoglobin 9,
hematocrit 26.4, platelets 225.  Hemoglobin A1c is 6.8%.  TSH 5.95.
 
ASSESSMENT AND PLAN:
1.  Type 2 diabetes mellitus.
 
Judging by the patient's reported blood glucose values, which I do not
necessarily take it face value given her confused state, as well as her
hemoglobin A1c of 6.8%, she appears to be under adequate control.
 
Over the past 3 days, the patient has not needed any insulin coverage as her
Lantus has been on hold and she had not had any scheduled insulin coverage.  Her
only active antidiabetic therapy over the past few days as has been that of
linagliptin 5 mg daily in addition to coverage with Humalog supplemental scale
as needed.  Yet, she has maintained pretty good range of control with most of
her blood glucose values falling into target range.
 
I was told by the staff that the patient's daughter, Keira, has been strongly
opposed to giving the patient any insulin and has probably instigated the
discontinuation of Lantus few days ago.  I have been trying to reach Keira
without success so far.  I will continue to do so.
 
While I do not particularly mind a current level of control for the patient as
she has mostly had blood glucose values in target range, should she need her
therapy advanced, insulin would be the main stay given her comorbidities,
especially stage 4 chronic kidney disease.
 
In the immediate setting, I will continue with linagliptin coverage as well as
Humalog supplemental scale low intensity, which will be customized so as to
avoid insulin intake for blood glucose values less than 200.  Blood glucose
monitoring will continue a.c. and at bedtime.
 
2.  Hypothyroidism.
 
Longstanding and maintained on levothyroxine 75 mcg daily.  The patient's level
 
 
 
[facility]
[address] [place]   [address]                     CONSULTATION                  
_______________________________________________________________________________
 
Name:       JACOBDANYELLE L              Room #:         514-P       [facility] [place]  
.R.#:      [id number]                       Account #:      [id number]  
Admission:  09/01/20    Attend Phys:    Cal Harvey MD 
Discharge:              Date of Birth:  [birth year]  
                                                          Report #: [id number]
                                                                    [id number]   
_______________________________________________________________________________
of control is inadequate judging by her TSH of 5.9.  I will adjust her
levothyroxine upward to 88 mcg daily.  A thyroid function study followup will be
needed in 6-8 weeks to evaluate the outcome of this therapeutic adjustment.
 
3.  Hypertension.  The patient's level of blood pressure control has generally
been adequate, although it is marginal today.  She is to continue with the
current regimen.
 
I have reviewed the patient's clinical care notes past and present, laboratory
data and other pertinent clinical information for over 35 minutes in addition to
my review time with her.
 
I appreciate this consultation by Dr. Harvey.
 
 
 
 
 
 
 
 
 
 
 
 
 
 
 
 
 
 
 
 
 
 
 
 
 
 
 
 
 
 
 
  <ELECTRONICALLY SIGNED>
   By: Zaheer Duff MD         
  09/06/20     1224
D: 09/05/20 1213                           _____________________________________
T: 09/05/20 1309                           Zaheer Duff MD           /nt

## 2020-09-07 VITALS — DIASTOLIC BLOOD PRESSURE: 58 MMHG | SYSTOLIC BLOOD PRESSURE: 111 MMHG

## 2020-09-07 VITALS — SYSTOLIC BLOOD PRESSURE: 152 MMHG | DIASTOLIC BLOOD PRESSURE: 74 MMHG

## 2020-09-07 NOTE — NUR
ASSUMED CARE OF PT AT 0700. PT IS A&OX4 AND VITAL SIGNS ARE STABLE. PT LESS
AGITATED THIS SHIFT, BUT STILL MAKES COMMENTS ABOUT GETTING  ON SUNDAY.
DURING THESE EPISODES PT REMAINS A&OX4. LAST BOWEL MOVEMENT NOTED ON 9/3, PT
OFFERED PRUNE JUICE WITH NO RESULTS AT THIS TIME, NIGHT SHIFT NURSE AWARE AND
STATES THAT SHE WILL ADMINISTER ORDERED SUPPOSITORY AT HS. DAUGHTER ON THE
UNIT AND ASKED ABOUT PTS SLEEP. INFORMED HER ABOUT THE NIGHT NURSES REPORT.
PER DAUGHTER REQUEST PT TO HAVE SLEEP AID ADMINISTERD TONIGHT. ACCU CHECKS
ACHS. FALL PRECAUTIONS IN PLACE AND NURSING WILL CONTINUE TO MONITOR.

## 2020-09-08 VITALS — DIASTOLIC BLOOD PRESSURE: 93 MMHG | SYSTOLIC BLOOD PRESSURE: 160 MMHG

## 2020-09-08 VITALS — SYSTOLIC BLOOD PRESSURE: 140 MMHG | DIASTOLIC BLOOD PRESSURE: 63 MMHG

## 2020-09-08 NOTE — NUR
PT CARE ASSUMED 0700, ALERT AND ORIENTED X4, PT CONTINUES TO BE CONFUSED BUT
NOT IMPULSIVE. PT DENIES ANY PAIN. PT IS ON ROOM AIR, NO SIGNS OF DISTRESS
NOTED. PT WAS UP IN THE CHAIR FOR MOST OF THE DAY. FALL PRECAUTIONS IN PLACE.
CALL LIGHT AND TABLE WAS WITHIN REACH
1800 PT DAUGHTER VISITING UPDATED IN PT CARE
1900 REPORT GIVEN TO PM NURSE.

## 2020-09-08 NOTE — NUR
assumed care approx 1900 evening 9/7. pt lying in bed sleeping at change of
shift. walter to dd with clear yellow urine to bag. pt awoke to take hs meds
with water tolerating well. pt appears to be sleeping soundly with hourly
rounding checks. bed alarm on and call light in reach. will continue to
monitor.

## 2020-09-08 NOTE — NUR
delia notified by isaias with  [facility] acute rehab they out of net work and cant
accept for acute rehab. delia spoke with daughter fausto and she stated will i am
little better than over the weekend i was way upset, but spoke with ok now.
will see with blanca says when she calls me back"/fausto daughter.

## 2020-09-09 VITALS — SYSTOLIC BLOOD PRESSURE: 127 MMHG | DIASTOLIC BLOOD PRESSURE: 83 MMHG

## 2020-09-09 VITALS — SYSTOLIC BLOOD PRESSURE: 113 MMHG | DIASTOLIC BLOOD PRESSURE: 64 MMHG

## 2020-09-09 LAB
ANION GAP SERPL CALC-SCNC: 12 MMOL/L (ref 7–16)
BASOPHILS NFR BLD AUTO: 0 % (ref 0–2)
BUN SERPL-MCNC: 94 MG/DL (ref 7–18)
CALCIUM SERPL-MCNC: 9.4 MG/DL (ref 8.5–10.1)
CHLORIDE SERPL-SCNC: 102 MMOL/L (ref 98–107)
CO2 SERPL-SCNC: 27 MMOL/L (ref 21–32)
CREAT SERPL-MCNC: 3.1 MG/DL (ref 0.6–1)
EOSINOPHIL NFR BLD: 2 % (ref 0–3)
ERYTHROCYTE [DISTWIDTH] IN BLOOD BY AUTOMATED COUNT: 13.2 % (ref 10.5–14.5)
GLUCOSE SERPL-MCNC: 136 MG/DL (ref 74–106)
GRANULOCYTES NFR BLD MANUAL: 55 % (ref 36–66)
HCT VFR BLD CALC: 27.6 % (ref 37–47)
HGB BLD-MCNC: 9.1 GM/DL (ref 12–15)
LYMPHOCYTES NFR BLD AUTO: 32 % (ref 24–44)
MAGNESIUM SERPL-MCNC: 2.4 MG/DL (ref 1.8–2.4)
MCH RBC QN AUTO: 31.1 PG (ref 26–34)
MCHC RBC AUTO-ENTMCNC: 33.1 G/DL (ref 28–37)
MCV RBC: 93.8 FL (ref 80–100)
MONOCYTES NFR BLD: 10 % (ref 1–8)
NEUTROPHILS # BLD: 3.6 THOU/UL (ref 1.4–8.2)
NEUTS BAND NFR BLD: 1 % (ref 0–8)
PLATELET # BLD EST: NORMAL 10*3/UL
PLATELET # BLD: 303 THOU/UL (ref 150–400)
POTASSIUM SERPL-SCNC: 4.8 MMOL/L (ref 3.5–5.1)
RBC # BLD AUTO: 2.94 MIL/UL (ref 4.2–5)
RBC MORPH BLD: NORMAL
SODIUM SERPL-SCNC: 141 MMOL/L (ref 136–145)
WBC # BLD AUTO: 6.5 THOU/UL (ref 4–11)

## 2020-09-09 NOTE — NUR
PT ALERT, CONFUSED.  YELLING OUT AT TIMES FOR HELP.  WANTING SOMETHING TO EAT.
OFFERED SNACKS BUT SHE WANTS NURSE TO GO TO STORE AND GET HER CANDY OR
COOKIES.  BECAME VERY AGITATED WHEN NURSE TRIED TO EXPLAIN TO HER THAT WE
DIDN'T HAVE THOSE THINGS ON HAND.  PT TOOK HS MEDS WITHOUT DIFFICULTY.  DAVID
PATENT DRAINING CLEAR YELLOW URINE.  PT DENIES PAIN OR DISCOMFORT.  BED ALARM
ON FOR SAFETY.  PT SLEEPING FOR SHORT INTERVALS.  SEROQUEL GIVEN AT HS AS
ORDERED.

## 2020-09-09 NOTE — NUR
PT ALERT, CONFUSED.  UP IN W/C ALL EVENING.  TRANSFERRED TO BED AT HS WITH MAX
ASSIST X 2.  DAVID PATENT DRAINING ADEQUATE AMTS CLEAR YELLOW URINE.  PT
DENIES PAIN OR DISCOMFORT.  BED ALARM ON FOR SAFETY.  PT APPEARS TO BE
SLEEPING ON HOURLY ROUNDS.  PT SLEEPING AFTER SEROQUEL GIVEN AT HS.  TRAZADONE
NOT GIVEN.

## 2020-09-09 NOTE — NUR
ASSUMED CARES AT 0700. PT AWAKE, CONFUSED AND DELUSIONAL. ORIENTED TO PERSON
ONLY. PT STATED THAT SHE IS GETTING  ON SUNDAY AND REFUSED PHYSICAL
THERAPY STATING THAT SHE HAS MAKE CALLS TO MAKE HER WEDDING PLANS. PT REFUSED
TO EAT HER BREAKFAST AND LUNCH STATING THAT HER DAUGHTER WAS MAKING HER A
THANKSGIVING MEAL. MULTIPLE ATTEMPTS TO REORIENT HER BY DIFFERENT STAFF
ATTEMPTED WITHOUT SUCCESS. DAVID REMAINS INTACT AND PATENT, URINE IS LIGHT
YELLOW AND CLEAR WITH NO FOUL ODOR. PT UP WITH 1-2 MOD-MAX ASSIST, GB AND
WALKER. FREQ. VISUAL CHECKS. CALL LIGHT WITHIN REACH. FALL PRECAUTIONS IN
PLACE.
NB: PT CONTINUES TO STATE THAT SHE HASN'T HAD ANY SLEEP IN 50HRS AND THAT SHE
HAS BEEN CALLING MULTIPLE TIMES AND NOBODY ANSWERS HER CALL LIGHT OR DOESN'T
HAVE A CALL LIGHT. PT CALLS MULTIPLE TIMES DURING THE DAY AND STAFF HAS BEEN
CONSISTENT IN ANSWERING HER CALLS.

## 2020-09-10 VITALS — DIASTOLIC BLOOD PRESSURE: 55 MMHG | SYSTOLIC BLOOD PRESSURE: 116 MMHG

## 2020-09-10 VITALS — DIASTOLIC BLOOD PRESSURE: 55 MMHG | SYSTOLIC BLOOD PRESSURE: 136 MMHG

## 2020-09-10 LAB
ALBUMIN SERPL-MCNC: 2.8 G/DL (ref 3.4–5)
ANION GAP SERPL CALC-SCNC: 11 MMOL/L (ref 7–16)
BUN SERPL-MCNC: 106 MG/DL (ref 7–18)
CALCIUM SERPL-MCNC: 8.6 MG/DL (ref 8.5–10.1)
CHLORIDE SERPL-SCNC: 102 MMOL/L (ref 98–107)
CO2 SERPL-SCNC: 27 MMOL/L (ref 21–32)
CREAT SERPL-MCNC: 3.7 MG/DL (ref 0.6–1)
GLUCOSE SERPL-MCNC: 106 MG/DL (ref 74–106)
PHOSPHATE SERPL-MCNC: 5.6 MG/DL (ref 2.5–4.9)
POTASSIUM SERPL-SCNC: 4.4 MMOL/L (ref 3.5–5.1)
SODIUM SERPL-SCNC: 140 MMOL/L (ref 136–145)

## 2020-09-10 NOTE — HC
[facility]
Chrissie Khalil
[place]   [address]                     CONSULTATION                  
_______________________________________________________________________________
 
Name:       DANYELLE JAMES              Room #:         514-P       [facility] [place]  
M.R.#:      [id number]                       Account #:      [id number]  
Admission:  09/01/20    Attend Phys:    Cal Harvey MD 
Discharge:              Date of Birth:  [birth year]  
                                                          Report #: [id number]
                                                                    [id number]   
_______________________________________________________________________________
THIS REPORT FOR:  
 
cc:  Radha Coello MD,Radha Triana,Paul TEJEDA. PhD                                           ~
CC: Radha Harvey
 
DATE OF SERVICE:  09/06/2020
 
 
AGE:  80.
 
ATTENDING PHYSICIAN:  Cal Harvey MD..
 
CONSULTANT:  Paul Triana, PhD.
 
CLINICAL PRESENTATION:  The patient is an 80-year-old female admitted to the [facility] on 08/28/2020 with confusion and mental status changes. 
She was noted to have sepsis, bacteremia and persistent urinary tract infection
and chronic kidney disease.  Her daughter was concerned about her cognitive
functioning and the patient was diagnosed with a toxic metabolic encephalopathy.
 
Her assessment on admission to the rehabilitation unit was toxic metabolic
encephalopathy, medical complexity with generalized debilitation, sepsis with
bacteremia, recurrent urinary tract infection, chronic kidney disease stage 3,
diabetes mellitus type 2 and iron deficiency anemia.  A complete description of
her medical condition and history along with medications can be found in her
medical record.  Neuropsychological consultation was requested to provide
assistance in the assessment of cognitive and emotional status and to provide
recommendations and services.
 
Prior to this most recent admission, the patient was living with the assistance
of her daughter in the daughter's home.  The patient has 4 children.  She is a
high school graduate.  The patient was employed as a  for
[employer]  prior to her [employer].  There is no
reported previous history of treatment for mood disorder.
 
TECHNIQUES UTILIZED:  Clinical interview, review of medical records, staff
consultation and behavioral observation, mini mental status exam 2 standard
version, clock drawing and brief category and letter fluency assessment  --
interview with daughter.
 
EXAMINATION FINDINGS:  The patient was pleasant and cooperative with the
assessment.  She accurately described events surrounding her initial
hospitalization.  There is no evidence of aphasia.  Her thoughts were goal
 
 
 
[facility]
[address] [place]   [address]                     CONSULTATION                  
_______________________________________________________________________________
 
Name:       JACOBDANYELLE L              Room #:         514-P       [facility] [place]  
.R.#:      [id number]                       Account #:      [id number]  
Admission:  09/01/20    Attend Phys:    Cal Harvey MD 
Discharge:              Date of Birth:  [birth year]  
                                                          Report #: [id number]
                                                                    [id number]   
_______________________________________________________________________________
oriented and organized.  However, evidence of delusional ideation is noted as
she indicates feeling very excited about getting  Sunday. She has been
experiencing delusional ideation regarding her marriage plans.
 
Her symptoms are reported to include decreased sensitivity to taste and smell,
decreased memory, difficulty with word finding.  She does not report anxiety or
depression or indicate having difficulty with sleep or appetite.  As noted,
there is no prior history of treatment for anxiety/depression or alcohol/drug
abuse.  Decreased speed of problem solving and thought organization was noted
during the interview.
 
Her daughter describes her as having a several year history of declining
cognition.  Intermittent disorientation and confusion has been associated with
urinary tract infections that do resolve upon management of the infection; 
however, there has been a continuing decline in cognitive functioning.  The
patient discontinued driving about 8 years ago.  She requires assistance in the
management of bill payment which her daughter has taken over for several years.
Difficulty managing medication is described and her daughter has started to
take it over.  The history of delusional ideation is about 1 year duration.
 
Her performance on the MMSE 2 brief version is extremely low with a raw score of
10, T score of 15.  She was 3/3 for initial registration, 3/5 for orientation to
time, 4/5 for orientation to place.  She was 0/3 for immediate recall of 3 items
after a brief time delay and distraction.
 
Performance on the MMSE 2 standard version was a raw score of 20/30, which is a
T score of 25 and percentile rank of 1.  She was 2/5 for serial sevens, 2/2 for
naming, 1/1 for repetition, 3/3 for auditory comprehension.  She could read and
follow a single command and write a sentence.  The patient was unable to copy a
simple geometric design.
 
The patient was unable to accurately place the numbers in a clock regarding
clock drawing.  Numbers were written in reverse order and she was unable to set
the hands at a designated time.
 
Brief letter fluency was at the 18th percentile with a T score of 41.  Brief
assessment of category fluency was extremely low with a raw score of 5 and a T
score of 25, which is less than 1%.
 
The patient is presenting with deficits in immediate memory, thought
organization, verbal fluency and visual spatial organization.  Delusional
ideation is also noted.  Given her current presentation and history of
cognitive decline, a neurodegenerative disorder is suggested.
 
DIAGNOSTIC IMPRESSION:  Major neurocognitive disorder (dementia), possibly due
to Alzheimer disease, with a decreased insight and delusional ideation -- extent
 
 
 
[facility]
[address]
[place]   [address]                     CONSULTATION                  
_______________________________________________________________________________
 
Name:       DANYELLE JAMES              Room #:         514-P       [facility] [place]  
M.R.#:      [id number]                       Account #:      [id number]  
Admission:  09/01/20    Attend Phys:    Cal Harvey MD 
Discharge:              Date of Birth:  [birth year]  
                                                          Report #: [id number]
                                                                    [id number]   
_______________________________________________________________________________
to be determined, likely in the mild to moderate range.
 
RECOMMENDATIONS:  The patient would benefit from a workup and treatment program
for neurodegenerative disorder with Alzheimer type features.  She will require
continued assistance in the management of medication, finances and nutrition. 
Upon discharge, Neurology consultation with a focus on upper extremity tremor as
well as neurodegenerative disorder would be beneficial.  I have talked with the
daughter about followup neuropsychological testing to clarify the severity of
cognitive status.
 
Regarding delusional ideation, the patient is compliant with distraction. 
Redirection of her attention to specific theraputic activities as well reality
orientation as necessary will assist in managment.
 
Thank you very much for allowing me to provide the consultation on this patient.
 
 
 
 
 
 
 
 
 
 
 
 
 
 
 
 
 
 
 
 
 
 
 
 
 
 
 
 
 
  <ELECTRONICALLY SIGNED>
   By: Paul Triana, PhD           
  09/10/20     1729
D: 09/07/20 1317                           _____________________________________
T: 09/07/20 1334                           Paul Triana, PhD             /nt

## 2020-09-10 NOTE — NUR
ASSUMED CARES AT 0700. PT AWAKE, ORIENTED TO PERSON ONLY, FORGETFUL AND
CONFUSED. DELUSIONAL, PT TOLD STAFF THAT SHE WAS BUILDING A Zoroastrian HERE AND
THAT SHE WAS GETTING  IN THAT Zoroastrian. VITALS REMAIN STABLE. DENIES
PAIN. DAVID CATHETER REMAINS INTACT AND PATENT, URINE IS YELLOW WITH WHITISH
SEDIMENTS. 1L NS ADMINISTERED VIA RIGHT AC PIV. PT UP WITH 1-2 MOD-MAX ASSIST,
GB AND WALKER. Q1H VISUAL CHECKS. CALL LIGHT WITHIN REACH. FALL PRECAUTIONS IN
PLACE

## 2020-09-11 VITALS — DIASTOLIC BLOOD PRESSURE: 39 MMHG | SYSTOLIC BLOOD PRESSURE: 131 MMHG

## 2020-09-11 VITALS — DIASTOLIC BLOOD PRESSURE: 53 MMHG | SYSTOLIC BLOOD PRESSURE: 110 MMHG

## 2020-09-11 LAB
ALBUMIN SERPL-MCNC: 2.6 G/DL (ref 3.4–5)
ANION GAP SERPL CALC-SCNC: 10 MMOL/L (ref 7–16)
BUN SERPL-MCNC: 97 MG/DL (ref 7–18)
CALCIUM SERPL-MCNC: 8.2 MG/DL (ref 8.5–10.1)
CHLORIDE SERPL-SCNC: 105 MMOL/L (ref 98–107)
CO2 SERPL-SCNC: 25 MMOL/L (ref 21–32)
CREAT SERPL-MCNC: 3.1 MG/DL (ref 0.6–1)
EOSINOPHIL NFR BLD: 3 % (ref 0–3)
ERYTHROCYTE [DISTWIDTH] IN BLOOD BY AUTOMATED COUNT: 13.1 % (ref 10.5–14.5)
GLUCOSE SERPL-MCNC: 126 MG/DL (ref 74–106)
GRANULOCYTES NFR BLD MANUAL: 55 % (ref 36–66)
HCT VFR BLD CALC: 23.1 % (ref 37–47)
HGB BLD-MCNC: 7.9 GM/DL (ref 12–15)
LG PLATELETS BLD QL SMEAR: (no result)
LYMPHOCYTES NFR BLD AUTO: 29 % (ref 24–44)
MCH RBC QN AUTO: 32.5 PG (ref 26–34)
MCHC RBC AUTO-ENTMCNC: 34.3 G/DL (ref 28–37)
MCV RBC: 94.8 FL (ref 80–100)
MONOCYTES NFR BLD: 13 % (ref 1–8)
NEUTROPHILS # BLD: 3.2 THOU/UL (ref 1.4–8.2)
PHOSPHATE SERPL-MCNC: 4.5 MG/DL (ref 2.5–4.9)
PLATELET # BLD: 247 THOU/UL (ref 150–400)
POTASSIUM SERPL-SCNC: 4.4 MMOL/L (ref 3.5–5.1)
RBC # BLD AUTO: 2.44 MIL/UL (ref 4.2–5)
RBC MORPH BLD: NORMAL
SODIUM SERPL-SCNC: 140 MMOL/L (ref 136–145)
WBC # BLD AUTO: 5.8 THOU/UL (ref 4–11)

## 2020-09-11 NOTE — NUR
ASSUMED CARES AT 0700. PT AWAKE, ALERT AND ORIENTED TO PERSON AND SITUATION.
CONFUSED AND FORGETFUL. DENIES PAIN. VITALS REMAIN STABLE. DAVID REMAINS
INTACT AND PATENT, URINE IS LIGHT YELLOW AND CLEAR WITH NO FOUL ODOR. PT
PARTICIPATED WELL IN ALL THERAPIES, WORKED WITH PHYSICAL THERAPY TODAY AND
COMPLETED FULL SESSION. WHEN ADMINISTERING EPOETIN ALPHA THIS EVENING PT
ASKED, " IS THAT PROCRIT SHOT?, MY DAUGHTER MENTIONED IT THIS MORNING AND
WANTED ME TO TAKE IT?". SHE TALKED ABOUT HER SON'S HOME SITUATION BUT NEVER
MENTIONED ANYTHING ABOUT "HER WEDDING" OR BUILDING Sabianism. PT HASN'T MADE
MULTIPLE CALLS OR REQUESTED AS MUCH AS SHE HAS BEEN. NOTED THAT PT HASN'T USED
HER CALL LIGHT AS MUCH OR CALLED FOR SOMEONE TO COME INTO HER ROOM LIKE SHE
HAS BEEN DOING. STAFF CHECKED ON HER HOURLY AND SHE DIDN'T NEED ANYTHING EACH
TIME. UP WITH 1 MOD ASSIST, GB AND WALKER AND TOLERATED WELL. CALL LIGHT
WITHIN REACH. FALL PRECAUTIONS IN PLACE.

## 2020-09-11 NOTE — NUR
assumed care approx 1900 evening 9/10. pt sitting up in bed at change of
shift. pt pleasant and cooperative,  forgetful and at times confused. walter to
dd with clear, yellow urine to bag. pt took hs meds with water tolerating
well. pt calling out at times in the night not sleeping well. pt given
sleeping meds as ordered. appears to be sleeping at present. bed alarm on and
call light in reach. will continue to monitor.

## 2020-09-12 VITALS — SYSTOLIC BLOOD PRESSURE: 112 MMHG | DIASTOLIC BLOOD PRESSURE: 56 MMHG

## 2020-09-12 VITALS — SYSTOLIC BLOOD PRESSURE: 121 MMHG | DIASTOLIC BLOOD PRESSURE: 62 MMHG

## 2020-09-12 LAB
ALBUMIN SERPL-MCNC: 2.9 G/DL (ref 3.4–5)
ALT SERPL-CCNC: 13 U/L (ref 30–65)
ANION GAP SERPL CALC-SCNC: 9 MMOL/L (ref 7–16)
AST SERPL-CCNC: 7 U/L (ref 15–37)
BILIRUB SERPL-MCNC: 0.3 MG/DL (ref 0.2–1)
BUN SERPL-MCNC: 93 MG/DL (ref 7–18)
CALCIUM SERPL-MCNC: 8.7 MG/DL (ref 8.5–10.1)
CHLORIDE SERPL-SCNC: 104 MMOL/L (ref 98–107)
CO2 SERPL-SCNC: 27 MMOL/L (ref 21–32)
CREAT SERPL-MCNC: 2.5 MG/DL (ref 0.6–1)
GLUCOSE SERPL-MCNC: 106 MG/DL (ref 74–106)
PHOSPHATE SERPL-MCNC: 4.2 MG/DL (ref 2.5–4.9)
POTASSIUM SERPL-SCNC: 4.1 MMOL/L (ref 3.5–5.1)
PROT SERPL-MCNC: 6.9 G/DL (ref 6.4–8.2)
SODIUM SERPL-SCNC: 140 MMOL/L (ref 136–145)

## 2020-09-12 NOTE — NUR
ASSUMED CARE AT 0700. PT WAS STILL SLEEPING AFTER REPORT AND SLOWLY WAS UP
WITH OT. SHE REPORTED HAVING A GOOD NIGHT SLEEP. DENIES ANY PAIN OR
DISCOMFORT. TOOK ALL HER MEDS WITHOUT ANY DIFFICULTY. BS WAS 95 AND INSULIN
WAS NOT INDICATED. PT ATE MORE THAN 50% OF HER BREAKFAST TODAY. DAVID INTACT
AND DRAINING WELL. HAD A LARGE BM. PARTICIPATED WITH THERAPY THIS MORNING. NO
SIGN OF DELUSION OR HALLUCINATION. ON SEROQUEL. CONT TO MONITOR.

## 2020-09-12 NOTE — NUR
TRANSFER FROM  WITH MODERATE 2P ASSIST TO BED AT 2100, MEDS AN HOUR LATER
WITH MANY SIPS OF WATER AND ASLEEP AN HOUR AFTER THAT. HAS BEEN SLEEPING SINCE
2250. JOANN COOK DD. PLEASANT

## 2020-09-12 NOTE — NUR
HAS SLEPT QUIETLY SINCE 2300 LAST NIGHT. WAKES EASILY TO SPOKEN NAME AND
SWALLOWS SMALL PILLS AND WATER EASILY AT THIS TIME

## 2020-09-13 VITALS — DIASTOLIC BLOOD PRESSURE: 49 MMHG | SYSTOLIC BLOOD PRESSURE: 130 MMHG

## 2020-09-13 VITALS — SYSTOLIC BLOOD PRESSURE: 130 MMHG | DIASTOLIC BLOOD PRESSURE: 52 MMHG

## 2020-09-13 NOTE — NUR
PT A&OX SELF AND SITUATION. IV INTACT IN R AC. R BKA. DAVID TO DD. PT IN
PLEASANT MOOD TODAY. CALL LIGHT W/I REACH, BED ALARM ON.

## 2020-09-13 NOTE — NUR
PT TRANSFERRING FROM WC TO BED WITH ASSIST AND IS TOLERATING FAIR.  DENIES
PAIN.  RESTING COMFORTABLY.  NO NEEDS VOICED.  CALL LIGHT WITHIN REACH.
FREQUENT OBSERVATION.

## 2020-09-14 VITALS — SYSTOLIC BLOOD PRESSURE: 124 MMHG | DIASTOLIC BLOOD PRESSURE: 63 MMHG

## 2020-09-14 VITALS — SYSTOLIC BLOOD PRESSURE: 116 MMHG | DIASTOLIC BLOOD PRESSURE: 48 MMHG

## 2020-09-14 LAB
ANION GAP SERPL CALC-SCNC: 10 MMOL/L (ref 7–16)
BUN SERPL-MCNC: 88 MG/DL (ref 7–18)
CALCIUM SERPL-MCNC: 8.7 MG/DL (ref 8.5–10.1)
CHLORIDE SERPL-SCNC: 104 MMOL/L (ref 98–107)
CO2 SERPL-SCNC: 26 MMOL/L (ref 21–32)
CREAT SERPL-MCNC: 2.6 MG/DL (ref 0.6–1)
GLUCOSE SERPL-MCNC: 100 MG/DL (ref 74–106)
HCT VFR BLD CALC: 24.2 % (ref 37–47)
HGB BLD-MCNC: 8.1 GM/DL (ref 12–15)
MAGNESIUM SERPL-MCNC: 2.3 MG/DL (ref 1.8–2.4)
POTASSIUM SERPL-SCNC: 4.6 MMOL/L (ref 3.5–5.1)
SODIUM SERPL-SCNC: 140 MMOL/L (ref 136–145)

## 2020-09-14 NOTE — H
[facility]
Chrissie Khalil
[place]   [address]                     HISTORY AND PHYSICAL          
_______________________________________________________________________________
 
Name:       DANYELLE JAMES              Room #:         514-P       [facility] [place]  
M.R.#:      [id number]                       Account #:      [id number]  
Admission:  09/01/20    Attend Phys:    Cal Harvey MD 
Discharge:              Date of Birth:  [birth year]  
                                                          Report #: [id number]
                                                                    [id number]   
_______________________________________________________________________________
THIS REPORT FOR:  
 
cc:  Radha Coello MD,Radha Harvey,Cal CHOW MD                                         ~
CC: Radha Harvey
 
DATE OF SERVICE:  09/01/2020
 
 
HISTORY AND PHYSICAL/POST ADMISSION PHYSICIAN EVALUATION
 
HISTORY OF PRESENT ILLNESS:  The patient is an 80-year-old female who was
originally admitted on 08/28/2020 with confusion and mental status changes.  She
was noted to have sepsis, bacteremia, persistent urinary tract infection,
chronic kidney disease stage 3.  She was placed on IV antibiotics.  Daughter was
concern regarding decreased overall cognition and a toxic metabolic
encephalopathy was discussed with her.  She was treated for the sepsis with
bacteremia.  She has had a decline in her overall functional status.  She has
now been admitted for acute in-hospital inpatient rehabilitation.
 
PAST MEDICAL HISTORY:  Prior medical history includes prior history of cardiac
arrest and PEA with hypoxic respiratory failure and left-sided weakness on
03/2020.  She has a history of a right below-knee amputation, chronic left lower
extremity diabetic ulcer, diabetes mellitus type 1, and aspiration pneumonia.
 
SOCIAL HISTORY:  Lives in a house with her daughter, a ramp to get in, primarily
uses a wheelchair to get around.  Daughter is currently working from home with
the COVID pandemic.  The patient had been able to transfer herself prior to
coming to the hospital, was having problems with increased weakness and was
needing more assistance by the daughter as far as basic transfers and dressing. 
She had been able to don her prosthesis, but was needing more assistance in that
just recently.
 
REVIEW OF SYSTEMS:  No complaints of chest pain, shortness of breath or
abdominal discomfort.
 
MEDICATIONS:  Please see the full medication listing.
 
ALLERGIES:  As noted.
 
PHYSICAL EXAMINATION:
GENERAL:  An 80-year-old -American female in no obvious distress.  She
was sleepy, responsive.
VITAL SIGNS:  Temperature 97.8, pulse 84, respirations 20, blood pressure
 
 
 
[facility]
[address] [place]   [address]                     HISTORY AND PHYSICAL          
_______________________________________________________________________________
 
Name:       DANYELLE JAMES              Room #:         514-P       [facility] [place]  
[facility]#:      [id number]                       Account #:      [id number]  
Admission:  09/01/20    Attend Phys:    Cal Harvey MD 
Discharge:              Date of Birth:  [birth year]  
                                                          Report #: [id number]
                                                                    [id number]   
_______________________________________________________________________________
120/61.
HEENT:  Facies appeared symmetric.
CHEST:  Some decreased breath sounds throughout, but appeared clear.
CARDIOVASCULAR:  Regular rate and rhythm.
ABDOMEN:  Bowel sounds positive, nontender.
GENITOURINARY AND RECTAL:  Deferred.
EXTREMITIES:  She has some decreased range of motion of both of her shoulders
that appears chronic.  Strength is grade 3+/5 in her upper extremities.  Lower
extremities; she has the old right below-knee amputation well healed.  Bilateral
lower extremity strength is probably a grade 3+/5.  She has been needing
assistance as far as basic transfers.
 
ASSESSMENT:  An 80-year-old -American female with the following problem
list:
1.  Toxic metabolic encephalopathy.
2.  Medical complexity with generalized debilitation.
3.  Sepsis with bacteremia.
4.  Recurrent urinary tract infection.
5.  Chronic kidney disease stage 3.
6.  Diabetes mellitus type 2.
7.  Iron deficiency anemia.
 
PLAN:  The patient was premorbidly primarily wheelchair bound, but was able to
transfer herself.  We will have her working in therapies to try to hopefully
achieve as close to this level as we can.
 
From a postadmission physician evaluation perspective, there are no relevant
changes since the preadmission screening.  Please see the above review of prior
and current medical and functional conditions and comorbidities.  Please see the
patient's previous and current functional status.  As far as risk of
complications, the patient has multiple medical comorbidities as noted above. 
Initial plan of care involves the interdisciplinary acute inpatient
rehabilitation program.  Measurable functional goals would be for the patient to
hopefully improve as far as strength, mobility, ADLs endurance and cognition, so
that she can return back to the home setting.  Goals to achieve a wheelchair
level again.  Prognosis is reasonably good.  Estimated length of stay is
probably at least 10 days to 2 weeks.  Potential barriers would include her
multiple medical comorbidities and decreased functional status.
 
The patient meets diagnostic criteria for an acute in-hospital inpatient
rehabilitation stay.  She meets the medical necessity criteria.  She does have
 
 
 
[address]   [address]                     HISTORY AND PHYSICAL          
_______________________________________________________________________________
 
Name:       THOONDANYELLE              Room #:         514-P       [facility] [place]  
..#:      [id number]                       Account #:      [id number]  
Admission:  09/01/20    Attend Phys:    Cal Harvey MD 
Discharge:              Date of Birth:  [birth year]  
                                                          Report #: [id number]
                                                                    [id number]   
_______________________________________________________________________________
the tolerance for therapies and has appropriate discharge goals back to the home
setting.
 
 
 
 
 
 
 
 
 
 
 
 
 
 
 
 
 
 
 
 
 
 
 
 
 
 
 
 
 
 
 
 
 
 
 
 
 
 
 
 
 
 
  <ELECTRONICALLY SIGNED>
   By: Cla Harvey MD         
  09/14/20     1041
D: 09/02/20 0918                           _____________________________________
T: 09/02/20 1017                           Cal Harvey MD           /nt

## 2020-09-14 NOTE — NUR
ASSUMED CARE AT 0700. PT IS UP WITH OT AND JUST HAD A BED BATH. SITTING
COMFORTABLY IN HER CHAIR, DENIES ANY PAIN. ALERT AND ORIENTATED TO SELF AND
PLACE, PLEASANT AND NOT IN ANY DISTRESS. SLEPT WELL LAST NIGHT. NO SIGN OF
DELUSIONS OR HALLUCINATIONS. ATE ALMOST 75% OF HER BREAKFAST AND TOOK HER
MEDICATIONS WHOLE WITHOUT ANY DIFFICULTY. SPOKE TO EDILIA NP REGARDING
DAUGHTER'S CONCERNS ABOUT MEDICATION (VELTASSA) AND WANTS LABS (BMP) DRAWN
PRIOR TO DISCHARGE ON THURSDAY. DAVID DRAINING WELL AND ORDERS RECEIVED TO
CLAMP DAVID Q4 HOURS WHILE AWAKE. PARTICIPATING IN THERAPY, SLOW PROGRESSION
AND PLAN FOR DISCHARGE ON THURSDAY WITH DAUGHTER. CONT TO MONITOR,

## 2020-09-14 NOTE — PLAN
[facility]
Chrissie Khalil
[place]   [address]                     REHAB UNIT PLAN OF CARE       
_______________________________________________________________________________
 
Name:       ERMELINDA JAMESN L              Room #:         514-P       [facility] [place]  
M.R.#:      [id number]                       Account #:      [id number]  
Admission:  09/01/20    Attend Phys:    Cal Harvey MD 
Discharge:              Date of Birth:  [birth year]  
                                                          Report #: [id number]
                                                                    [id number]   
_______________________________________________________________________________
THIS REPORT FOR:   //name//                          
 
CC: Radha Harvey
 
DATE OF SERVICE:  09/04/2020
 
 
PROGRESS NOTE/OVERALL PLAN OF CARE
 
SUBJECTIVE:  The patient was seen back today in followup.  She complains that
she has been sleeping very well.  Temperature 36.3, pulse 92, respirations 16,
blood pressure 133/70.  She is alert, follows commands.  She has been working in
her therapies with transfers, bed to wheelchair to max assist with a sliding
board.  She was able to hop 10 feet min assist with a front-wheeled walker.  Bed
mobility, sit to supine and has been mod assist.  Lower body dressing is
dependent.  Speech therapy, she has moderate-to-severe cognitive deficits, has
some perseveration as noted.  Apparently was telling the speech therapist that
she thought she was getting .  She is however, oriented x 4.
 
ASSESSMENT:  An 80-year-old -American female with the following problem
list:
1.  Toxic metabolic encephalopathy.
2.  Medical complexity with generalized debilitation.
3.  Sepsis with bacteremia.
4.  Recurrent urinary tract infection.
5.  Chronic kidney disease stage 3.
6.  Type 2 diabetes mellitus.  Hemoglobin A1c is elevated.
7.  Iron deficiency anemia.
8.  History of hallucinations.
 
PLAN:  The overall plan of care is based on the preadmission screen,
post-admission physician evaluation and information garnered from therapy
assessments.
1.  Estimated length of stay is probably at least 10 days to 2 weeks.
2.  Medical prognosis is reasonably good.
3.  Anticipated interventions includes the interdisciplinary acute inpatient
rehabilitation program.
4.  Anticipated functional outcomes would be for the patient to improve as far
as basic transfers to hopefully get to the point where she can be at least
wheelchair bound and hopefully able to don and doff her prosthesis so she can
return back to the home setting.
5.  Discharge destination back home with daughter.
6.  Expected therapy by discipline includes PT, OT and speech 1 hour per day
 
 
 
[address]   [address]                     REHAB UNIT PLAN OF CARE       
_______________________________________________________________________________
 
Name:       DANYELLE JAMES              Room #:         514-P       [facility] [place]  
[facility]#:      [id number]                       Account #:      [id number]  
Admission:  09/01/20    Attend Phys:    Cal Harvey MD 
Discharge:              Date of Birth:  [birth year]  
                                                          Report #: [id number]
                                                                    [id number]   
_______________________________________________________________________________
each five days a week throughout the duration of the acute inpatient
rehabilitation program.
 
 
 
 
 
 
 
 
 
 
 
 
 
 
 
 
 
 
 
 
 
 
 
 
 
 
 
 
 
 
 
 
 
 
 
 
 
 
 
 
 
 
  <ELECTRONICALLY SIGNED>
   By: Cal Harvey MD         
  09/14/20     1041
D: 09/04/20 1529                           _____________________________________
T: 09/04/20 1948                           Cal Harvey MD           /nt

## 2020-09-14 NOTE — NUR
assumed care approx 1900 evening 9/13. pt lying in bed with head of bed
elevated at change of shift. pt awake, alert, pleasant, confused at times. pt
took hs meds with water tolerating well. walter to dd with clear, yellow urine
to bag. pt appears to be sleeping soundly with hourly rounding checks. bed
alarm on and call light in reach. will continue to monitor.

## 2020-09-15 VITALS — SYSTOLIC BLOOD PRESSURE: 109 MMHG | DIASTOLIC BLOOD PRESSURE: 57 MMHG

## 2020-09-15 VITALS — SYSTOLIC BLOOD PRESSURE: 13 MMHG | DIASTOLIC BLOOD PRESSURE: 43 MMHG

## 2020-09-15 NOTE — NUR
ASSUMED CARE AT 0700. PT IS STILL IN BED, AWAKE AND REPORTED SLEPT WELL.
DENIES ANY PAIN. APPETITE FAIR WITH 50% INTAKE PER MEAL. PARTICIPATING WITH
THERAPY. ORDERS RECEIVED TO REMOVE DAVID TODAY AND TO BLADDER SCAN Q4 HOURS
AND IF PVR ABOVE 400ML TO STRAIGHT CATH. PT ROOM CHANGED TO [facility] AND DAUGHTERYULISA NOTED DUE TO NEEDED ROOM FOR ANOTHER PATIENT WHO NEEDS FREQUENT
SUPERVISION. PLAN FOR DISCHARGE HOME ON THURSDAY WITH DAUGHTER WITH HOME
HEALTH SERVICES. PER TEAM MEETING, PT'S DAUGHTER WILL BENEFIT FROM THERAPY
TRAINING. NO NEW CONCERNS AND WILL CONT TO MONITOR.

## 2020-09-15 NOTE — NUR
team meeting, reccomendation: nursing going to try remove walter cath today.
daughter to work with therapy prior to dc on 17th amedysis ( pt, ot, and
nursing). she needs to have assistance with dressing and annia care. no dme
needs. family to assit with bills and pills.

## 2020-09-15 NOTE — NUR
DAVID REMOVED AND PT TOLERATED PROCEDURE WELL. BLADDER SCAN AND IF MORE THAN
400ML OK TO STRAIGHT CATH. IF CONTINUES TO HAVE MORE THAN 2 PVR ABOVE 400ML TO
CALL MD.

## 2020-09-16 VITALS — SYSTOLIC BLOOD PRESSURE: 127 MMHG | DIASTOLIC BLOOD PRESSURE: 52 MMHG

## 2020-09-16 VITALS — SYSTOLIC BLOOD PRESSURE: 133 MMHG | DIASTOLIC BLOOD PRESSURE: 54 MMHG

## 2020-09-16 NOTE — NUR
PT IS ALERT TO SELF, PLACE AND SITUATION. SHE SEEMS A LITTLE CONFUSED AT
TIMES.  REQUIRED ASSIST X 2 TO GO USE THE BATHROOM.DENIES PAIN. BY AROUND 2300
HRS, PT WAS STILL AWAKE AND THEREFORE SHE ASKED FOR TRAZODONE. SHE APPEARS TO
BE SLEEPING AT THIS TIME.SHE VOIDED X 1 IN TOILET BEFORE GOING TO BED. OVERALL
PATIENT SEEMS HAPPY WITH HER PROGRESS AND CARE.SHE CALLS WITH NEEDS.FALL PREC
IN PLACE.

## 2020-09-16 NOTE — NUR
PT SPOKE WITH Pt'S DAUGHTER, RUSTAM, REGARDING FAMILY TRAINING. DAUGHTER NOT
AVAILABLE TO COME IN. PT DISCUSSED Pt'S FUNCTIONAL LEVEL AT THIS TIME, WILL
NEED ASSIST WITH FUNCTIONAL MOBILITY. RUSTAM VERBALIZES UNDERSTANDING AND DOES
NOT VOICE ANY FURTHER QUESTIONS OR CONCERNS

## 2020-09-16 NOTE — NUR
ASSUMED CARES AT 0700. PT ORIENTED TO PERSON AND SITUATION, CONFUSED AND
DELUSIONAL. PT STATED, " AM GOING TO  [facility] FOR ALL THE STAFF HERE, MY
DAUGHTER IS BRINGING IT IN". VITALS REMAIN STABLE. DENIES PAIN. PT
PARTICIPATED WELL IN ALL THERAPIES. PT UP WITH 1 MIN ASSIST, GB AND WALKER/WC
AND TOLERATED WELL. Q1H VISUAL CHECKS. CALL LIGHT WITHIN REACH. FALL
PRECAUTIONS IN PLACE.

## 2020-09-17 VITALS — DIASTOLIC BLOOD PRESSURE: 46 MMHG | SYSTOLIC BLOOD PRESSURE: 114 MMHG

## 2020-09-17 LAB
ANION GAP SERPL CALC-SCNC: 9 MMOL/L (ref 7–16)
BASOPHILS NFR BLD AUTO: 1 % (ref 0–2)
BUN SERPL-MCNC: 94 MG/DL (ref 7–18)
CALCIUM SERPL-MCNC: 8.6 MG/DL (ref 8.5–10.1)
CHLORIDE SERPL-SCNC: 102 MMOL/L (ref 98–107)
CO2 SERPL-SCNC: 27 MMOL/L (ref 21–32)
CREAT SERPL-MCNC: 3.7 MG/DL (ref 0.6–1)
EOSINOPHIL NFR BLD: 1 % (ref 0–3)
ERYTHROCYTE [DISTWIDTH] IN BLOOD BY AUTOMATED COUNT: 13.4 % (ref 10.5–14.5)
GLUCOSE SERPL-MCNC: 142 MG/DL (ref 74–106)
GRANULOCYTES NFR BLD MANUAL: 52 % (ref 36–66)
HCT VFR BLD CALC: 24 % (ref 37–47)
HGB BLD-MCNC: 8.2 GM/DL (ref 12–15)
LYMPHOCYTES NFR BLD AUTO: 38 % (ref 24–44)
MAGNESIUM SERPL-MCNC: 2.3 MG/DL (ref 1.8–2.4)
MCH RBC QN AUTO: 32 PG (ref 26–34)
MCHC RBC AUTO-ENTMCNC: 34.2 G/DL (ref 28–37)
MCV RBC: 93.5 FL (ref 80–100)
MONOCYTES NFR BLD: 8 % (ref 1–8)
NEUTROPHILS # BLD: 3.4 THOU/UL (ref 1.4–8.2)
PLATELET # BLD EST: NORMAL 10*3/UL
PLATELET # BLD: 248 THOU/UL (ref 150–400)
POTASSIUM SERPL-SCNC: 4.2 MMOL/L (ref 3.5–5.1)
RBC # BLD AUTO: 2.57 MIL/UL (ref 4.2–5)
RBC MORPH BLD: NORMAL
SODIUM SERPL-SCNC: 138 MMOL/L (ref 136–145)
WBC # BLD AUTO: 6.6 THOU/UL (ref 4–11)

## 2020-09-17 NOTE — NUR
ASSUMED CARES AT 0700. PT ORIENTED TO PERSON AND PLACE, CONFUSED AND
FORGETFUL. DENIES PAIN AT THIS TIME. VITALS REMAIN STABLE. PT VOIDING WELL,
BLADDER SCANNED POST VOID 14CC. URINE IS YELLOW AND CLEAR. PT UP WITH 1
ASSIST, GB AND WALKER AND RLE PROSTHESIS AND TOLERATED WELL. PT TO DC THIS
AFTERNOON WITH DAUGHTER AND HOMEHEALTH, DC TEACHING AND INSTRUCTIONS TO BE
DISCUSSED WITH DAUGHTER AND PATIENT AT THE BEDSIDE. Q1H VISUAL CHECKS. CALL
LIGHT WITHIN REACH. FALL PRECAUTIONS IN PLACE

## 2020-09-17 NOTE — NUR
PT DISCHARGING TODAY TO HOME WITH [facility]  FAXED DC ORDERS/SUMMARY SPOKE
WITH TEO IN INTAKE THEY RECEIVED ORDERS AND WILL ARRANGE VISITS WITH PT.

## 2020-09-17 NOTE — NUR
Assumed pt's care this pm shift. Pt alert and oriented. Forgetful. Pt took
meds per emar. Pt slept well this shift. Pt voiced having possible intention
of suing the hopsital because there were nights that she reported "not being
able to sleep and the doctor did nothing". Pt verbalized it as " neglect".
Per nursing report, pt have hx of delusion. Pt anticipated dc home today with
dtr. Fall precaution in place. Bladder scan this shift showed 117ml. Call
light within reach. Hourly roundings made. Will continue to monitor.

## 2021-01-13 ENCOUNTER — HOSPITAL ENCOUNTER (INPATIENT)
Dept: HOSPITAL 35 - ER | Age: 81
LOS: 5 days | Discharge: HOME HEALTH | DRG: 291 | End: 2021-01-18
Attending: HOSPITALIST | Admitting: HOSPITALIST
Payer: COMMERCIAL

## 2021-01-13 VITALS — DIASTOLIC BLOOD PRESSURE: 50 MMHG | SYSTOLIC BLOOD PRESSURE: 170 MMHG

## 2021-01-13 VITALS — BODY MASS INDEX: 26.93 KG/M2 | WEIGHT: 152 LBS | HEIGHT: 63 IN

## 2021-01-13 VITALS — SYSTOLIC BLOOD PRESSURE: 170 MMHG | DIASTOLIC BLOOD PRESSURE: 50 MMHG

## 2021-01-13 VITALS — SYSTOLIC BLOOD PRESSURE: 167 MMHG | DIASTOLIC BLOOD PRESSURE: 61 MMHG

## 2021-01-13 VITALS — DIASTOLIC BLOOD PRESSURE: 54 MMHG | SYSTOLIC BLOOD PRESSURE: 161 MMHG

## 2021-01-13 DIAGNOSIS — N18.30: ICD-10-CM

## 2021-01-13 DIAGNOSIS — I13.0: Primary | ICD-10-CM

## 2021-01-13 DIAGNOSIS — N17.9: ICD-10-CM

## 2021-01-13 DIAGNOSIS — E11.51: ICD-10-CM

## 2021-01-13 DIAGNOSIS — E03.9: ICD-10-CM

## 2021-01-13 DIAGNOSIS — G47.00: ICD-10-CM

## 2021-01-13 DIAGNOSIS — Z79.84: ICD-10-CM

## 2021-01-13 DIAGNOSIS — N39.0: ICD-10-CM

## 2021-01-13 DIAGNOSIS — Z88.8: ICD-10-CM

## 2021-01-13 DIAGNOSIS — Z85.038: ICD-10-CM

## 2021-01-13 DIAGNOSIS — Z90.49: ICD-10-CM

## 2021-01-13 DIAGNOSIS — K92.1: ICD-10-CM

## 2021-01-13 DIAGNOSIS — D64.9: ICD-10-CM

## 2021-01-13 DIAGNOSIS — E87.5: ICD-10-CM

## 2021-01-13 DIAGNOSIS — F03.91: ICD-10-CM

## 2021-01-13 DIAGNOSIS — Z20.822: ICD-10-CM

## 2021-01-13 DIAGNOSIS — Z79.899: ICD-10-CM

## 2021-01-13 DIAGNOSIS — I50.9: ICD-10-CM

## 2021-01-13 DIAGNOSIS — Z91.018: ICD-10-CM

## 2021-01-13 DIAGNOSIS — E43: ICD-10-CM

## 2021-01-13 DIAGNOSIS — I25.10: ICD-10-CM

## 2021-01-13 DIAGNOSIS — Z89.511: ICD-10-CM

## 2021-01-13 DIAGNOSIS — E78.5: ICD-10-CM

## 2021-01-13 DIAGNOSIS — E11.22: ICD-10-CM

## 2021-01-13 DIAGNOSIS — E78.00: ICD-10-CM

## 2021-01-13 LAB
ALBUMIN SERPL-MCNC: 2.8 G/DL (ref 3.4–5)
ALT SERPL-CCNC: 15 U/L (ref 30–65)
ANION GAP SERPL CALC-SCNC: 8 MMOL/L (ref 7–16)
AST SERPL-CCNC: 9 U/L (ref 15–37)
BACTERIA-REFLEX: (no result) /HPF
BASOPHILS NFR BLD AUTO: 0.4 % (ref 0–2)
BILIRUB SERPL-MCNC: 0.2 MG/DL (ref 0.2–1)
BILIRUB UR-MCNC: NEGATIVE MG/DL
BUN SERPL-MCNC: 64 MG/DL (ref 7–18)
CALCIUM SERPL-MCNC: 8.8 MG/DL (ref 8.5–10.1)
CHLORIDE SERPL-SCNC: 105 MMOL/L (ref 98–107)
CO2 SERPL-SCNC: 27 MMOL/L (ref 21–32)
COLOR UR: YELLOW
CREAT SERPL-MCNC: 2.4 MG/DL (ref 0.6–1)
EOSINOPHIL NFR BLD: 1.3 % (ref 0–3)
ERYTHROCYTE [DISTWIDTH] IN BLOOD BY AUTOMATED COUNT: 13.9 % (ref 10.5–14.5)
GLUCOSE SERPL-MCNC: 219 MG/DL (ref 74–106)
GRANULOCYTES NFR BLD MANUAL: 66.4 % (ref 36–66)
HCT VFR BLD CALC: 21.6 % (ref 37–47)
HGB BLD-MCNC: 7 GM/DL (ref 12–15)
INR PPP: 1
KETONES UR STRIP-MCNC: NEGATIVE MG/DL
LYMPHOCYTES NFR BLD AUTO: 19 % (ref 24–44)
MCH RBC QN AUTO: 30.3 PG (ref 26–34)
MCHC RBC AUTO-ENTMCNC: 32.3 G/DL (ref 28–37)
MCV RBC: 93.9 FL (ref 80–100)
MONOCYTES NFR BLD: 12.9 % (ref 1–8)
NEUTROPHILS # BLD: 4.6 THOU/UL (ref 1.4–8.2)
PLATELET # BLD: 163 THOU/UL (ref 150–400)
POTASSIUM SERPL-SCNC: 5.6 MMOL/L (ref 3.5–5.1)
PROT SERPL-MCNC: 6.6 G/DL (ref 6.4–8.2)
PROTHROMBIN TIME: 10.6 SECONDS (ref 9.3–11.4)
RBC # BLD AUTO: 2.3 MIL/UL (ref 4.2–5)
RBC # UR STRIP: (no result) /UL
RBC #/AREA URNS HPF: (no result) /HPF (ref 0–2)
SODIUM SERPL-SCNC: 140 MMOL/L (ref 136–145)
SP GR UR STRIP: 1.01 (ref 1–1.03)
SQUAMOUS: (no result) /LPF (ref 0–3)
TROPONIN I SERPL-MCNC: <0.06 NG/ML (ref ?–0.06)
URINE CLARITY: (no result)
URINE GLUCOSE-RANDOM*: NEGATIVE
URINE LEUKOCYTES-REFLEX: (no result)
URINE NITRITE-REFLEX: NEGATIVE
URINE PROTEIN (DIPSTICK): (no result)
URINE WBC-REFLEX: (no result) /HPF (ref 0–5)
UROBILINOGEN UR STRIP-ACNC: 0.2 E.U./DL (ref 0.2–1)
WBC # BLD AUTO: 6.9 THOU/UL (ref 4–11)

## 2021-01-13 PROCEDURE — 10040 EXTRACTION: CPT

## 2021-01-13 PROCEDURE — 30233N1 TRANSFUSION OF NONAUTOLOGOUS RED BLOOD CELLS INTO PERIPHERAL VEIN, PERCUTANEOUS APPROACH: ICD-10-PCS | Performed by: HOSPITALIST

## 2021-01-13 NOTE — EMS
[address]   [address]                     EMS Patient Care Report       
_______________________________________________________________________________
 
Name:       DANYELLE JAMES              Room #:                     PRE [facility].YOLANDE#:      [id number]                       Account #:      [id number]  
Admission:              Attend Phys:                          
Discharge:              Date of Birth:  [birth year]  
                                                          Report #: [id number]
                                                                    [id number]
_______________________________________________________________________________
THIS REPORT FOR:   //name//                          
 
Report Transmitted: 2021 15:17
EMS Care Summary
[facility] MED-ACT
Incident [id number] @ 2021 14:27
 
Incident Location
[address] [place] [address]
 
Patient
DANYELLE JAMES
Female, 80 Years
 1940
 
Patient Address
[address] [place] [address]
 
Patient History
Dementia,Diabetes,Chronic Kidney Disease,
 
 
 
Chief Complaint
increased weakness
 
Disposition
Transported No Lights/[facility]
 
Dispatch Reason
Sick Person
 
Transported To
[facility]
 
Narrative
Arrived to find pt seated in wheelchair in the hotel room. Pt a&ox4, NAD and in 
the presence of family members wearing a disposable face mask. 
 
Pts daughter reports pt has been weak for the last several weeks.  Pt had blood 
drawn recently and the lab results returned today.   Pts physician reported 
that pts hemoglobin was 7.5 and needed to get to the hospital urgently.  Pts 
 
 
 
[address]   [address]                     EMS Patient Care Report       
_______________________________________________________________________________
 
Name:       DANYELLE JAMES              Room #:                     PRE   
LINOEleanor#:      [id number]                       Account #:      [id number]  
Admission:              Attend Phys:                          
Discharge:              Date of Birth:  [birth year]  
                                                          Report #: [id number]
                                                                    [id number]
_______________________________________________________________________________
daughter reports she was concerned about moving pt into her car so she called 
911. 
 
Pt denies any chest pain, sob, cough, fever or drinking much water lately.
 
Pt was assisted to standing position and assisted to cot.  Pt secured with cot 
straps in semi-fowlers position and taken to MICU for non-emergent transport to 
[place] as pts daughter requested.  Pt rested comfortably enroute to [place] without any changes in condition.  Pt was moved via sheet from cot to 
hospital bed without incident.  Pt report and transfer of care given to ER RN 
room #2. 
 
Initial Vitals
@14:56SpO2: 100,MI Suspected: false
@14:45P: 83,R: 16,BP: 171/92,Glucose: 252,SpO2: 99,
@14:37P: 82,R: 16,BP: 133/70,Pain: 0/10,GCS: 15,Temp: 98.4F,SpO2: 100,Revised 
Trauma: 12, 
@14:57P: 83,R: 16,BP: 179/75,Pain: 0/10,GCS: 15,SpO2: 98,Revised Trauma: 12,
 
Assessments
@14:37MENTAL:Person Oriented,Time Oriented,Event Oriented,Place 
Oriented,SKIN:HEENT:Head/Face: No Abnormalities,Neck/Airway: No 
Abnormalities,LUNG SOUNDS:General: No Abnormalities,ABDOMEN:General: No 
Abnormalities,PELVIS//GI:No Abnormalities,EXTREMITIES:Right Leg: Other,Left 
Arm: No Abnormalities,Right Arm: No Abnormalities,Left Leg: No 
Abnormalities,PULSE:Radial: 2+ Normal,NEURO:No Abnormalities, 
 
Impression
Generalized Weakness
 
Procedures
@PTASurgical Mask on PatientResponse: Unchanged
 
Timeline
PTA,Surgical Mask on Patient,Response: Unchanged
14:25,Call Received
14:25,Psap Call
14:27,Dispatched
14:27,En Route
14:31,On Scene
14:36,At Patient
14:37,BP: 133/70 M,PULSE: 82,RR: 16 R,SPO2: 100 Ox,ETCO2:  ,BG: ,PAIN: 0,GCS: 
15, 
14:45,BP: 171/92 M,PULSE: 83,RR: 16 R,SPO2: 99 Ox,ETCO2:  ,B,PAIN: ,GCS: ,
14:46,Depart Scene
14:56,BP: / M,PULSE: ,RR:  R,SPO2: 100 Ox,ETCO2:  ,BG: ,PAIN: ,GCS: ,
 
 
 
[address]   [address]                     EMS Patient Care Report       
_______________________________________________________________________________
 
Name:       DANYELLE JAMES              Room #:                     PRE ER  
M.R.#:      [id number]                       Account #:      [id number]  
Admission:              Attend Phys:                          
Discharge:              Date of Birth:  [birth year]  
                                                          Report #: [id number]
                                                                    [id number]
_______________________________________________________________________________
14:57,BP: 179/75 M,PULSE: 83,RR: 16 R,SPO2: 98 Ox,ETCO2:  ,BG: ,PAIN: 0,GCS: 15,
14:59,At Destination
15:21,Call Closed
 
Disclaimer
v1.1     Copyright  [employer], [employer]
This EMS Care Summary contains data elements from the applicable legal record 
(which may be displayed differently). It is designed to provide pertinent 
information for the following purposes: continuity of care, clinical quality, 
and state data reporting. The complete legal record is available to ED staff 
and administrators of the receiving hospital in [employer]'s Patient Tracker. All data 
is provided "as is."

## 2021-01-14 VITALS — SYSTOLIC BLOOD PRESSURE: 174 MMHG | DIASTOLIC BLOOD PRESSURE: 75 MMHG

## 2021-01-14 VITALS — SYSTOLIC BLOOD PRESSURE: 176 MMHG | DIASTOLIC BLOOD PRESSURE: 78 MMHG

## 2021-01-14 VITALS — SYSTOLIC BLOOD PRESSURE: 153 MMHG | DIASTOLIC BLOOD PRESSURE: 62 MMHG

## 2021-01-14 LAB
ANION GAP SERPL CALC-SCNC: 8 MMOL/L (ref 7–16)
BASOPHILS NFR BLD AUTO: 0.4 % (ref 0–2)
BUN SERPL-MCNC: 56 MG/DL (ref 7–18)
CALCIUM SERPL-MCNC: 9.7 MG/DL (ref 8.5–10.1)
CHLORIDE SERPL-SCNC: 106 MMOL/L (ref 98–107)
CO2 SERPL-SCNC: 25 MMOL/L (ref 21–32)
CREAT SERPL-MCNC: 2.1 MG/DL (ref 0.6–1)
EOSINOPHIL NFR BLD: 0.9 % (ref 0–3)
ERYTHROCYTE [DISTWIDTH] IN BLOOD BY AUTOMATED COUNT: 13.7 % (ref 10.5–14.5)
GLUCOSE SERPL-MCNC: 210 MG/DL (ref 74–106)
GRANULOCYTES NFR BLD MANUAL: 71.4 % (ref 36–66)
HCT VFR BLD CALC: 21.3 % (ref 37–47)
HGB BLD-MCNC: 7 GM/DL (ref 12–15)
LYMPHOCYTES NFR BLD AUTO: 12.6 % (ref 24–44)
MAGNESIUM SERPL-MCNC: 1.7 MG/DL (ref 1.8–2.4)
MCH RBC QN AUTO: 31 PG (ref 26–34)
MCHC RBC AUTO-ENTMCNC: 33 G/DL (ref 28–37)
MCV RBC: 93.9 FL (ref 80–100)
MONOCYTES NFR BLD: 14.7 % (ref 1–8)
NEUTROPHILS # BLD: 5.6 THOU/UL (ref 1.4–8.2)
PLATELET # BLD: 156 THOU/UL (ref 150–400)
POTASSIUM SERPL-SCNC: 5.6 MMOL/L (ref 3.5–5.1)
RBC # BLD AUTO: 2.27 MIL/UL (ref 4.2–5)
SODIUM SERPL-SCNC: 139 MMOL/L (ref 136–145)
WBC # BLD AUTO: 7.9 THOU/UL (ref 4–11)

## 2021-01-14 NOTE — NUR
FAXED REFERRAL TO [facility] SPOKE WITH WALTER IN INTAKE SHE RECEIVED
REFERRAL AND WILL ACCEPT AT DISCHARGE.

## 2021-01-14 NOTE — EKG
[address] [employer]
[place]  [address]
Phone:  [phone]                    ELECTROCARDIOGRAM REPORT      
_______________________________________________________________________________
 
Name:       BERTHA JAMESRISHABH MORAN              Room #:         462-P       [facility] [place]  
..#:      [id number]     Account #:      [id number]  
Admission:  21    Attend Phys:    Edgardo Rao MD     
Discharge:              Date of Birth:  [birth year]  
                                                          Report #: [id number]
   [id number]
_______________________________________________________________________________
                         [facility] ED
                                       
Test Date:    2021               Test Time:    15:22:04
Pat Name:     DANYELLE JAMES           Department:   
Patient ID:   [id number]            Room:         462
Gender:       F                        Technician:   tari
:          1940               Requested By: Sarah Martines
Order Number: [id number] MD:   Mckay Jackson
                                 Measurements
Intervals                              Axis          
Rate:         80                       P:            61
AK:           214                      QRS:          -46
QRSD:         118                      T:            29
QT:           385                                    
QTc:          445                                    
                           Interpretive Statements
Sinus rhythm
Borderline prolonged AK interval
Probable left atrial enlargement
Left anterior fascicular block
Left ventricular hypertrophy
Anterior Q waves, possibly due to LVH
Compared to ECG 2020 15:06:32
Left anterior fascicular block now present
Q waves now present
Intraventricular conduction delay no longer present
Poor R-wave progression no longer present
Electronically Signed On 2021 7:18:18 CST by Mckay Jackson
[email or web address]
 
 
 
 
 
 
 
 
 
 
 
 
 
 
  <ELECTRONICALLY SIGNED>
   By: Mckay Jackson MD, FACC    
  21     0718
D: 21 1522                           _____________________________________
T: 21 1522                           Mckay Jackson MD, [facility]      /EPI
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
## 2021-01-14 NOTE — NUR
WOUND CONSULT;
THE LEFT HEEL HAS A STABLE BLISTER APPROX 4.0 X 4.0 X 0.  CURRENTLY IT IS
BEING OFFLOADED WITH A PILLOW.  NO OTHER AREAS OF CONCERNS.
 
RECOMMENDATIONS;
-ORDER A PRAFO BOOT
-TURN Q2H
-LOW AIRLOSS BED PUMP
-PAINT WOUND WITH BETADINE DAILY, LEAVE GARRETT FOR NOW.
 
DISCUSSED WITH RN
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
## 2021-01-15 VITALS — DIASTOLIC BLOOD PRESSURE: 60 MMHG | SYSTOLIC BLOOD PRESSURE: 174 MMHG

## 2021-01-15 VITALS — DIASTOLIC BLOOD PRESSURE: 74 MMHG | SYSTOLIC BLOOD PRESSURE: 174 MMHG

## 2021-01-15 VITALS — DIASTOLIC BLOOD PRESSURE: 67 MMHG | SYSTOLIC BLOOD PRESSURE: 171 MMHG

## 2021-01-15 LAB
ALBUMIN SERPL-MCNC: 2.5 G/DL (ref 3.4–5)
ANION GAP SERPL CALC-SCNC: 9 MMOL/L (ref 7–16)
BUN SERPL-MCNC: 52 MG/DL (ref 7–18)
CALCIUM SERPL-MCNC: 9.2 MG/DL (ref 8.5–10.1)
CHLORIDE SERPL-SCNC: 106 MMOL/L (ref 98–107)
CO2 SERPL-SCNC: 23 MMOL/L (ref 21–32)
CREAT SERPL-MCNC: 2.3 MG/DL (ref 0.6–1)
ERYTHROCYTE [DISTWIDTH] IN BLOOD BY AUTOMATED COUNT: 13.8 % (ref 10.5–14.5)
GLUCOSE SERPL-MCNC: 269 MG/DL (ref 74–106)
HCT VFR BLD CALC: 19.5 % (ref 37–47)
HCT VFR BLD CALC: 30.2 % (ref 37–47)
HGB BLD-MCNC: 10.2 GM/DL (ref 12–15)
HGB BLD-MCNC: 6.4 GM/DL (ref 12–15)
MCH RBC QN AUTO: 31 PG (ref 26–34)
MCHC RBC AUTO-ENTMCNC: 32.8 G/DL (ref 28–37)
MCV RBC: 94.7 FL (ref 80–100)
PHOSPHATE SERPL-MCNC: 4.1 MG/DL (ref 2.6–4.7)
PLATELET # BLD: 145 THOU/UL (ref 150–400)
POTASSIUM SERPL-SCNC: 6.3 MMOL/L (ref 3.5–5.1)
RBC # BLD AUTO: 2.06 MIL/UL (ref 4.2–5)
SODIUM SERPL-SCNC: 138 MMOL/L (ref 136–145)
WBC # BLD AUTO: 5.4 THOU/UL (ref 4–11)

## 2021-01-15 NOTE — NUR
WOUND CARE F/U;
THE LEFT HEEL BLISTER REMAINS STABLE. EMPLOYING A PRAFO BOOT.  THE PERIWOUND
IS WNL. NO S/S OF INFECTION.  THE PATIENT RESPONDS AND IS PLEASANT AND
COOPERATIVE.
 
RECOMMENDATIONS;  CONTINUE CURRENT ORDERS.
 
RN PRESENT

## 2021-01-15 NOTE — NUR
Assumed pt care this am, was received with a Hgb of 6.4 and K of 6.3, labs
received at 02:50, night nurse had advised night NP who inturn mentioned
that actions are to be taken by am MD. Informed Dr. Rao, 1 unit of PRBC
given with no adverse reactions noted. Daughter request the sliding scale not
be followed and insulin not given unless blood sugar is 250 and up. POC
followed with no signs or verbalizarions of distress noted.
 
Endorsed to the night nurse.

## 2021-01-15 NOTE — NUR
VSS-AFEBRILE.  LUNGS CLEAR-ROOM AIR.  RESTED WELL THROUGH NIGHT WITH FEW
NEEDS.  NO C/O PAIN.  SNACK AT BEDTIME, TOLERATED WELL WITH NO REPORTED N/V.
HGB/HCT CRITICAL THIS AM, RESULTS CALLED TO JACQUELIN JONES, AWAITING ORDERS.
ASSISTED TO TURN EVERY TWO HOURS OVERNIGHT.  FALL PRECAUTIONS IN PLACE, CALLS
APPROPRIATELY FOR ANY NEEDED ASSISTANCE.

## 2021-01-16 VITALS — SYSTOLIC BLOOD PRESSURE: 157 MMHG | DIASTOLIC BLOOD PRESSURE: 69 MMHG

## 2021-01-16 VITALS — SYSTOLIC BLOOD PRESSURE: 139 MMHG | DIASTOLIC BLOOD PRESSURE: 59 MMHG

## 2021-01-16 VITALS — SYSTOLIC BLOOD PRESSURE: 165 MMHG | DIASTOLIC BLOOD PRESSURE: 75 MMHG

## 2021-01-16 LAB
ALBUMIN SERPL-MCNC: 2.6 G/DL (ref 3.4–5)
ANION GAP SERPL CALC-SCNC: 8 MMOL/L (ref 7–16)
BUN SERPL-MCNC: 46 MG/DL (ref 7–18)
CALCIUM SERPL-MCNC: 9.6 MG/DL (ref 8.5–10.1)
CHLORIDE SERPL-SCNC: 104 MMOL/L (ref 98–107)
CO2 SERPL-SCNC: 25 MMOL/L (ref 21–32)
CREAT SERPL-MCNC: 2.2 MG/DL (ref 0.6–1)
ERYTHROCYTE [DISTWIDTH] IN BLOOD BY AUTOMATED COUNT: 13.8 % (ref 10.5–14.5)
GLUCOSE SERPL-MCNC: 149 MG/DL (ref 74–106)
HCT VFR BLD CALC: 26.8 % (ref 37–47)
HGB BLD-MCNC: 8.8 GM/DL (ref 12–15)
MCH RBC QN AUTO: 30.5 PG (ref 26–34)
MCHC RBC AUTO-ENTMCNC: 33 G/DL (ref 28–37)
MCV RBC: 92.6 FL (ref 80–100)
PHOSPHATE SERPL-MCNC: 3.3 MG/DL (ref 2.6–4.7)
PLATELET # BLD: 163 THOU/UL (ref 150–400)
POTASSIUM SERPL-SCNC: 4.8 MMOL/L (ref 3.5–5.1)
RBC # BLD AUTO: 2.89 MIL/UL (ref 4.2–5)
SODIUM SERPL-SCNC: 137 MMOL/L (ref 136–145)
WBC # BLD AUTO: 6.4 THOU/UL (ref 4–11)

## 2021-01-16 NOTE — NUR
VSS-AFEBRILE.  LUNGS CLEAR-ROOM AIR.  C/O GENERALIZED ALL OVER BODY PAIN,
RELIEVED WITH PO ULTRAM.  INCONTINENT MULTIPLE TIMES OF DARK BROWN WATERY
STOOL, AND URINE.  PLACED PURE WICK EXTERNAL CATHETER FOR COMFORT.  TURNED AND
OFFERED ORAL HYDRATION EVERY TWO HOURS FOR COMFORT.  FALL PRECAUTIONS IN
PLACE.

## 2021-01-16 NOTE — NUR
Assumed pt care at 7am.Pt in bed sleeping on and off.Assessment completed.vss.
Am meds given and well tolerated.Pt refuse one of the scheduled med this
morning but took it when dtr came to visit at lunch time.Drt wanted insulin
given only when blood sugar greater than 250.Will notify the doctor.Assisted
pt with tray setup and feeding.Fair appetite noted.Will continue to monitor.

## 2021-01-17 VITALS — SYSTOLIC BLOOD PRESSURE: 160 MMHG | DIASTOLIC BLOOD PRESSURE: 72 MMHG

## 2021-01-17 VITALS — DIASTOLIC BLOOD PRESSURE: 60 MMHG | SYSTOLIC BLOOD PRESSURE: 152 MMHG

## 2021-01-17 VITALS — DIASTOLIC BLOOD PRESSURE: 84 MMHG | SYSTOLIC BLOOD PRESSURE: 156 MMHG

## 2021-01-17 LAB
ANION GAP SERPL CALC-SCNC: 9 MMOL/L (ref 7–16)
BUN SERPL-MCNC: 53 MG/DL (ref 7–18)
CALCIUM SERPL-MCNC: 9.2 MG/DL (ref 8.5–10.1)
CHLORIDE SERPL-SCNC: 102 MMOL/L (ref 98–107)
CO2 SERPL-SCNC: 26 MMOL/L (ref 21–32)
CREAT SERPL-MCNC: 2.7 MG/DL (ref 0.6–1)
ERYTHROCYTE [DISTWIDTH] IN BLOOD BY AUTOMATED COUNT: 14 % (ref 10.5–14.5)
GLUCOSE SERPL-MCNC: 234 MG/DL (ref 74–106)
HCT VFR BLD CALC: 25.7 % (ref 37–47)
HGB BLD-MCNC: 8.6 GM/DL (ref 12–15)
MCH RBC QN AUTO: 30.6 PG (ref 26–34)
MCHC RBC AUTO-ENTMCNC: 33.3 G/DL (ref 28–37)
MCV RBC: 92 FL (ref 80–100)
PLATELET # BLD: 155 THOU/UL (ref 150–400)
POTASSIUM SERPL-SCNC: 4.4 MMOL/L (ref 3.5–5.1)
RBC # BLD AUTO: 2.8 MIL/UL (ref 4.2–5)
SODIUM SERPL-SCNC: 137 MMOL/L (ref 136–145)
WBC # BLD AUTO: 4.9 THOU/UL (ref 4–11)

## 2021-01-17 NOTE — NUR
Assumed pt care this am, VS stable Q2. External FC in place draining light
yellow urine. POC followed with no signs or verbalizations of distress noted.,
VS stable, refused insulin if blood sugar is less than 250, daughter at the
bedside. Pt is a set up, hydration and small frequent meals done. Edorsed
to the night nurse.

## 2021-01-17 NOTE — NUR
VSS-AFEBRILE.  LUNGS CLEAR-ROOM AIR.  RESTED WELL THROUGH NIGHT WITH FEW
NEEDS.  OCCASIONAL INCONTINENCE OF URINE.  FALL PRECAUTIONS IN PLACE, CALLS
APPROPRIATELY FOR ANY NEEDED ASSISTANCE.

## 2021-01-18 VITALS — SYSTOLIC BLOOD PRESSURE: 131 MMHG | DIASTOLIC BLOOD PRESSURE: 62 MMHG

## 2021-01-18 VITALS — SYSTOLIC BLOOD PRESSURE: 157 MMHG | DIASTOLIC BLOOD PRESSURE: 70 MMHG

## 2021-01-18 VITALS — DIASTOLIC BLOOD PRESSURE: 70 MMHG | SYSTOLIC BLOOD PRESSURE: 157 MMHG

## 2021-01-18 LAB
ANION GAP SERPL CALC-SCNC: 9 MMOL/L (ref 7–16)
BUN SERPL-MCNC: 57 MG/DL (ref 7–18)
CALCIUM SERPL-MCNC: 8.8 MG/DL (ref 8.5–10.1)
CHLORIDE SERPL-SCNC: 103 MMOL/L (ref 98–107)
CO2 SERPL-SCNC: 26 MMOL/L (ref 21–32)
CREAT SERPL-MCNC: 2.9 MG/DL (ref 0.6–1)
ERYTHROCYTE [DISTWIDTH] IN BLOOD BY AUTOMATED COUNT: 13.9 % (ref 10.5–14.5)
GLUCOSE SERPL-MCNC: 176 MG/DL (ref 74–106)
HCT VFR BLD CALC: 24.7 % (ref 37–47)
HGB BLD-MCNC: 8.2 GM/DL (ref 12–15)
MCH RBC QN AUTO: 30.8 PG (ref 26–34)
MCHC RBC AUTO-ENTMCNC: 33.4 G/DL (ref 28–37)
MCV RBC: 92.2 FL (ref 80–100)
PLATELET # BLD: 154 THOU/UL (ref 150–400)
POTASSIUM SERPL-SCNC: 3.8 MMOL/L (ref 3.5–5.1)
RBC # BLD AUTO: 2.67 MIL/UL (ref 4.2–5)
SODIUM SERPL-SCNC: 138 MMOL/L (ref 136–145)
WBC # BLD AUTO: 5.6 THOU/UL (ref 4–11)

## 2021-01-18 NOTE — NUR
PT EVALUATED FOR 5N REHAB AND DETERMINED TO BE APPROPRIATE FOR HOME WITH HH.
PT AND FAMILY IN AGREEMENT WITH THIS PLAN.

## 2021-01-18 NOTE — NUR
ASSUMED PT CARE THIS AM. PT CALLS APPROPRIATELY WHEN NEEDED. FALL PRECAUTIONS
IN [;ACE. IV PATENT, MEDS TAKEN AND INFUSED WELL WITHOUT COMPLAINT. FEMALE
EXTERNAL CATHETER IN PLACE. NO COMPLAINTS OF PAIN. PT BEING REPOSITIONED
APPROPRIATELY.

## 2021-01-18 NOTE — NUR
CM SPOKE WITH PT'S DTR THIS AM. SHE INDICATED THAT PREFERENCE FOR PT TO RETURN
TO THE HOTEL UPON DC WITH HH SERVICES. [facility] CAN ACCEPT PT AND
ORDERS HAVE BEEN FAXED. PT'S DTR PROVIDED TRANSPORT HOME THIS DAY. NO OTHER CM
INTERVENTION INDICATED. CASE CLOSED. no

## 2021-01-18 NOTE — NUR
ASSUMED PT CARE AT SHIFT CHANGE. PT IS A&OX4. PT SLEEPS FOR MAJORITY OF THE
SHIFT BUT IS AROUSABLE. PT IS ON ROOM AIR / HER LUNG SOUNDS ARE DIMINISHED.
PT TAKES MEDICATION WHOLE. PT REFUSED TO BE PRICKED FOR INSULIN / PT ALSO
REFUSED INSULIN AND SAID THAT HER DOCTOR WAS SUPPOSED TO STOP THIS ORDER. PT
HAS A RIGHT FOREARM IV SALINE LOCKED. PT HAS A RIGHT BKA. ENCOURAGED PT TO
DRINK FLUIDS. Q2 TURNS PERFORMED. HOURLY ROUNDING PERFORMED ON PT. WILL
CONITNUE TO MONITOR.

## 2021-01-18 NOTE — NUR
PT DISCHARGING TODAY TO HOME WITH [facility] FAXED DC ORDERS/SUMMARY RECEIVED
CONFIRMATION AND SPOKE WITH WALTER IN ADM SHE WILL ARRANGE VISITS WITH PT.

## 2021-01-20 NOTE — HC
[facility]
[address]
[place]   [address]                     CONSULTATION                  
_______________________________________________________________________________
 
Name:       DANYELLE JAMES DEAN              Room #:         462-P       [facility] [place]  
.R.#:      [id number]                       Account #:      [id number]  
Admission:  01/13/21    Attend Phys:    Edgardo Rao MD     
Discharge:  01/18/21    Date of Birth:  [birth year]  
                                                          Report #: [id number]
                                                                    [id number]   
_______________________________________________________________________________
THIS REPORT FOR:  
 
cc:  Jackson Jefferson Dimitri DO McElhinney, Christian C. MD                                   ~
 
DATE OF SERVICE:  01/16/2021
 
 
HISTORY OF PRESENT ILLNESS:  The patient is an 80-year-old female with multiple
medical problems including history of iron deficiency anemia requiring EPO.  She
was feeling weak in general, was noted to have hemoglobin of 7.5 from her
primary physician's office.  Hemoglobin here dropped to 6.4.  She was transfused
1 unit of packed cells.  Most recent hemoglobins have been 10.2 yesterday and
8.8.  She reports a long history of dark black stools; however, she has been
taking oral iron for a long period of time.  She denies any abdominal pain.  She
denies any nausea or vomiting.  She does report some heartburn symptoms at
times.  She takes sodium bicarbonate at home on a p.r.n. basis.  She denies any
other antacids in general.  She denies any dysphagia.  She is unsure if she has
ever had an upper endoscopy.  I looked back through the computer.  Her last
upper endoscopy was performed in 12/2019 showing mild gastritis, a few erosions,
no active bleeding and a duodenal diverticulum.  At that time, she was taking
Zantac.  We switched her to PPI therapy.  It does not look like she has been on
PPI therapy recently.  She has had a previous history of colon cancer, status
post resection.  Last colonoscopy, apparently in 2019.  She states her bowel
movements have been fairly normal, somewhat constipation at times.  Denies any
diarrhea.  She does take some Motrin on a p.r.n. basis.  She has had multiple
hospitalizations over the years.  Currently, denies any abdominal pain.  No
fevers or chills.  No chest pain or shortness of breath.
 
PAST MEDICAL HISTORY:  Recurrent urinary tract infection; chronic anemia, on
EPO; diabetes; hypertension; coronary artery disease; history of peripheral
vascular disease, status post stent placement; previous history of right
below-the-knee amputation; hypothyroidism; colon cancer, status post resection;
history of pyelonephritis; congestive heart failure; chronic renal
insufficiency; glaucoma; hypothyroidism; hypercholesterolemia.
 
REVIEW OF SYSTEMS:  As per HPI.
 
SOCIAL HISTORY:  Denies any tobacco or alcohol use.
 
FAMILY HISTORY:  Negative for colon cancer.
 
MEDICATIONS:  On admission, trazodone, metoprolol, Combigan eyedrops, iron
orally, Claritin, sodium bicarbonate, Tylenol p.r.n., MiraLax p.r.n., docusate,
Mucinex, furosemide, Xalatan.
 
 
 
[address]   [address]                     CONSULTATION                  
_______________________________________________________________________________
 
Name:       DANYELLE JAMES              Room #:         462-P       [facility]  
M.REleanor#:      [id number]                       Account #:      [id number]  
Admission:  01/13/21    Attend Phys:    Edgardo Rao MD     
Discharge:  01/18/21    Date of Birth:  [birth year]  
                                                          Report #: [id number]
                                                                    [id number]   
_______________________________________________________________________________
 
 
PHYSICAL EXAMINATION:
VITAL SIGNS:  Temperature is 36.6, blood pressure 157/69, respiratory rate 17,
pulse 83.
GENERAL:  She is alert and oriented x 3, in no acute distress.
HEENT:  Sclerae nonicteric.  Oropharynx clear.
NECK:  Supple, without lymphadenopathy.
CARDIOVASCULAR:  Regular rate and rhythm.
CHEST:  Clear to auscultation anteriorly bilaterally.
ABDOMEN:  Soft.  She is nontender, nondistended, normoactive bowel sounds.
EXTREMITIES:  No cyanosis, clubbing or edema.  Right below-the-knee amputation,
well healed.
 
LABORATORY DATA:  WBC 6.4, hemoglobin 8.8, platelet count is 163.  INR 1.0. 
Sodium 137, potassium 4.8, chloride 104, bicarbonate 25, BUN 46, creatinine 2.2,
glucose 149, calcium 9.6, phosphorus 3.3, magnesium 1.7, iron 57, TIBC 206,
percent sat is 28, ferritin 212, total bilirubin 0.2, AST 9, ALT 15, alkaline
phosphatase 85.  BNP 5506, albumin 2.6.
 
ASSESSMENT AND PLAN:  Anemia.  The patient with a history of chronic anemia, is
on EPO, suspect due to chronic renal insufficiency.  She does have black stools;
however, she has been taking oral iron.  She has undergone an EGD and a
colonoscopy in the last 2 years.  Upper endoscopy showing mild gastritis with a
few erosions, no signs of bleeding at that time.  I would recommend switching
from sodium bicarbonate to PPI therapy.  We will also Hemoccult test stools
here.  If signs of active bleeding, may need to consider repeat endoscopy at
that point.  We will continue to monitor hemoglobin closely.
 
Thank you for allowing me to participate in her care.
 
 
 
 
 
 
 
 
 
 
 
 
 
 
  <ELECTRONICALLY SIGNED>
   By: Cheko De La Cruz MD   
  01/20/21     1131
D: 01/16/21 1153                           _____________________________________
T: 01/16/21 1446                           Cheko De La Cruz MD     /nt

## 2021-03-14 ENCOUNTER — HOSPITAL ENCOUNTER (EMERGENCY)
Dept: HOSPITAL 35 - ER | Age: 81
Discharge: HOME | End: 2021-03-14
Payer: COMMERCIAL

## 2021-03-14 VITALS — SYSTOLIC BLOOD PRESSURE: 142 MMHG | DIASTOLIC BLOOD PRESSURE: 77 MMHG

## 2021-03-14 VITALS — WEIGHT: 130.01 LBS | HEIGHT: 61 IN | BODY MASS INDEX: 24.55 KG/M2

## 2021-03-14 DIAGNOSIS — Z91.018: ICD-10-CM

## 2021-03-14 DIAGNOSIS — Z88.8: ICD-10-CM

## 2021-03-14 DIAGNOSIS — E78.5: ICD-10-CM

## 2021-03-14 DIAGNOSIS — Z91.041: ICD-10-CM

## 2021-03-14 DIAGNOSIS — N18.30: ICD-10-CM

## 2021-03-14 DIAGNOSIS — E11.22: ICD-10-CM

## 2021-03-14 DIAGNOSIS — E03.9: ICD-10-CM

## 2021-03-14 DIAGNOSIS — I50.9: ICD-10-CM

## 2021-03-14 DIAGNOSIS — Z79.899: ICD-10-CM

## 2021-03-14 DIAGNOSIS — I13.0: ICD-10-CM

## 2021-03-14 DIAGNOSIS — M25.461: Primary | ICD-10-CM

## 2021-03-15 ENCOUNTER — HOSPITAL ENCOUNTER (INPATIENT)
Dept: HOSPITAL 35 - ER | Age: 81
LOS: 4 days | Discharge: NURSING, REHAB OR LONG-TERM CARE | DRG: 871 | End: 2021-03-19
Attending: HOSPITALIST | Admitting: HOSPITALIST
Payer: COMMERCIAL

## 2021-03-15 VITALS — BODY MASS INDEX: 31.21 KG/M2 | HEIGHT: 60.98 IN | WEIGHT: 165.3 LBS

## 2021-03-15 VITALS — DIASTOLIC BLOOD PRESSURE: 55 MMHG | SYSTOLIC BLOOD PRESSURE: 148 MMHG

## 2021-03-15 DIAGNOSIS — I50.9: ICD-10-CM

## 2021-03-15 DIAGNOSIS — Z91.041: ICD-10-CM

## 2021-03-15 DIAGNOSIS — N17.0: ICD-10-CM

## 2021-03-15 DIAGNOSIS — E11.51: ICD-10-CM

## 2021-03-15 DIAGNOSIS — Z89.511: ICD-10-CM

## 2021-03-15 DIAGNOSIS — E78.00: ICD-10-CM

## 2021-03-15 DIAGNOSIS — E78.5: ICD-10-CM

## 2021-03-15 DIAGNOSIS — N39.0: ICD-10-CM

## 2021-03-15 DIAGNOSIS — Z20.822: ICD-10-CM

## 2021-03-15 DIAGNOSIS — R65.20: ICD-10-CM

## 2021-03-15 DIAGNOSIS — Z95.820: ICD-10-CM

## 2021-03-15 DIAGNOSIS — M25.461: ICD-10-CM

## 2021-03-15 DIAGNOSIS — A41.9: Primary | ICD-10-CM

## 2021-03-15 DIAGNOSIS — Z85.038: ICD-10-CM

## 2021-03-15 DIAGNOSIS — Z88.8: ICD-10-CM

## 2021-03-15 DIAGNOSIS — Z86.73: ICD-10-CM

## 2021-03-15 DIAGNOSIS — L89.629: ICD-10-CM

## 2021-03-15 DIAGNOSIS — J18.9: ICD-10-CM

## 2021-03-15 DIAGNOSIS — Z79.4: ICD-10-CM

## 2021-03-15 DIAGNOSIS — E03.9: ICD-10-CM

## 2021-03-15 DIAGNOSIS — E11.22: ICD-10-CM

## 2021-03-15 DIAGNOSIS — R53.81: ICD-10-CM

## 2021-03-15 DIAGNOSIS — N18.30: ICD-10-CM

## 2021-03-15 DIAGNOSIS — J96.00: ICD-10-CM

## 2021-03-15 DIAGNOSIS — I13.0: ICD-10-CM

## 2021-03-15 DIAGNOSIS — D50.9: ICD-10-CM

## 2021-03-15 LAB
ALBUMIN SERPL-MCNC: 2.9 G/DL (ref 3.4–5)
ALT SERPL-CCNC: 10 U/L (ref 30–65)
ANION GAP SERPL CALC-SCNC: 10 MMOL/L (ref 7–16)
AST SERPL-CCNC: 10 U/L (ref 15–37)
BACTERIA-REFLEX: (no result) /HPF
BASOPHILS NFR BLD AUTO: 0.7 % (ref 0–2)
BILIRUB SERPL-MCNC: 0.2 MG/DL (ref 0.2–1)
BILIRUB UR-MCNC: NEGATIVE MG/DL
BUN SERPL-MCNC: 67 MG/DL (ref 7–18)
CALCIUM SERPL-MCNC: 8.6 MG/DL (ref 8.5–10.1)
CHLORIDE SERPL-SCNC: 105 MMOL/L (ref 98–107)
CO2 SERPL-SCNC: 24 MMOL/L (ref 21–32)
COLOR UR: YELLOW
CREAT SERPL-MCNC: 3.1 MG/DL (ref 0.6–1)
EOSINOPHIL NFR BLD: 1.6 % (ref 0–3)
ERYTHROCYTE [DISTWIDTH] IN BLOOD BY AUTOMATED COUNT: 15.2 % (ref 10.5–14.5)
GLUCOSE SERPL-MCNC: 140 MG/DL (ref 74–106)
GRANULOCYTES NFR BLD MANUAL: 65.5 % (ref 36–66)
HCT VFR BLD CALC: 25 % (ref 37–47)
HGB BLD-MCNC: 8.1 GM/DL (ref 12–15)
KETONES UR STRIP-MCNC: NEGATIVE MG/DL
LYMPHOCYTES NFR BLD AUTO: 16.2 % (ref 24–44)
MCH RBC QN AUTO: 30.5 PG (ref 26–34)
MCHC RBC AUTO-ENTMCNC: 32.5 G/DL (ref 28–37)
MCV RBC: 93.9 FL (ref 80–100)
MONOCYTES NFR BLD: 16 % (ref 1–8)
MUCUS: (no result) STRN/LPF
NEUTROPHILS # BLD: 4.6 THOU/UL (ref 1.4–8.2)
PLATELET # BLD: 134 THOU/UL (ref 150–400)
POTASSIUM SERPL-SCNC: 4.9 MMOL/L (ref 3.5–5.1)
PROT SERPL-MCNC: 6.7 G/DL (ref 6.4–8.2)
RBC # BLD AUTO: 2.66 MIL/UL (ref 4.2–5)
RBC # UR STRIP: (no result) /UL
SODIUM SERPL-SCNC: 139 MMOL/L (ref 136–145)
SP GR UR STRIP: 1.02 (ref 1–1.03)
SQUAMOUS: (no result) /LPF (ref 0–3)
URINE CLARITY: (no result)
URINE GLUCOSE-RANDOM*: NEGATIVE
URINE LEUKOCYTES-REFLEX: (no result)
URINE NITRITE-REFLEX: NEGATIVE
URINE PROTEIN (DIPSTICK): (no result)
URINE WBC-REFLEX: (no result) /HPF (ref 0–5)
UROBILINOGEN UR STRIP-ACNC: 0.2 E.U./DL (ref 0.2–1)
WBC # BLD AUTO: 7.1 THOU/UL (ref 4–11)

## 2021-03-15 PROCEDURE — 10102: CPT

## 2021-03-15 NOTE — EMS
[address]   [address]                     EMS Patient Care Report       
_______________________________________________________________________________
 
Name:       DANYELLE JAMES              Room #:                     REG TAYLOR MCCARTHY#:      [id number]                       Account #:      [id number]  
Admission:  03/15/21    Attend Phys:                          
Discharge:              Date of Birth:  [birth year]  
                                                          Report #: [id number]
                                                                    [id number]
_______________________________________________________________________________
THIS REPORT FOR:   //name//                          
 
Report Transmitted: 03/15/2021 20:41
EMS Care Summary
[facility] MED-ACT
Incident [id number] @ 03/15/2021 18:00
 
Incident Location
[address] [place] [address]
 
Patient
DANYELLE JAMES
Female, 81 Years
 1940
 
Patient Address
[address] [place] [address]
 
Patient History
Dementia,Diabetes,Chronic Kidney Disease,
 
Patient Allergies
Iron,
 
Patient Medications
Other,
 
Chief Complaint
generalized weakness
 
Disposition
Transported No Lights/[place]
 
Dispatch Reason
Sick Person
 
Transported To
[facility]
 
Narrative
Arrived on scene to find the pt sitting on the toilet. Family member in 
attendance. OPFD providing care. 
 
 
 
[address]   [address]                     EMS Patient Care Report       
_______________________________________________________________________________
 
Name:       DANYELLE JAMES              [facility] #:                     [facility]      [id number]                       Account #:      [id number]  
Admission:  03/15/21    Attend Phys:                          
Discharge:              Date of Birth:  [birth year]  
                                                          Report #: [id number]
                                                                    [id number]
_______________________________________________________________________________
 
Pt reports generalized weakness over the last 2-3 days that has progressively 
gotten worse. Pt reports difficulty urinating and constant feeling to urinate. 
Family member suspects UTI. Family member states the pt just had knee surgery 
yesterday but was not evaluated for an UTI. Pt denies pain, discomfort, nausea, 
vomiting, dizziness or shortness of breath. 
 
Exam, pt is assisted to her feet by EMS, pivots and sits on a wheelchair. 
Wheelchair to cot. Pt is then secured to the cot and taken to the ambulance 
without incident. En route, no changes in complaint or presentation. Biocom 
given. Pt is assisted to her feet by EMS and moved to a wheelchair in triage. 
Verbal report given. 
 
Initial Vitals
@18:24P: 93,BP: 158/68,SpO2: 90,
@18:14P: 85,R: 18,BP: 144/70,Pain: 0/10,GCS: 15,Temp: 97.4F,SpO2: 94,Revised 
Trauma: 12, 
 
Assessments
@18:10MENTAL:Person Oriented,Time Oriented,Place Oriented,Event 
Oriented,SKIN:HEENT:Head/Face: No Abnormalities,Neck/Airway: No 
Abnormalities,LUNG SOUNDS:General: No Abnormalities,ABDOMEN:General: No 
Abnormalities,PELVIS//GI:EXTREMITIES:Left Arm: No Abnormalities,Right Arm: No 
Abnormalities,Left Leg: No Abnormalities,Right Leg: No 
Abnormalities,PULSE:NEURO:No Abnormalities, 
 
Impression
Generalized Weakness
 
Procedures
@PTASurgical Mask on PatientResponse: Unchanged
 
Timeline
PTA,Surgical Mask on Patient,Response: Unchanged
17:47,Call Received
17:47,Psap Call
18:00,Dispatched
18:01,En Route
18:03,On Scene
18:05,At Patient
18:14,BP: 144/70 M,PULSE: 85,RR: 18 R,SPO2: 94 Ox,ETCO2:  ,BG: ,PAIN: 0,GCS: 15,
18:15,Depart Scene
18:24,BP: 158/68 M,PULSE: 93,RR:  R,SPO2: 90 Ox,ETCO2:  ,BG: ,PAIN: ,GCS: ,
18:26,At Destination
18:46,Call Closed
 
 
 
 
[facility]
[address]
[place]   [address]                     EMS Patient Care Report       
_______________________________________________________________________________
 
Name:       DANYELLE JAMES              Room #:                     [facility]      [id number]                       Account #:      [id number]  
Admission:  03/15/21    Attend Phys:                          
Discharge:              Date of Birth:  [birth year]  
                                                          Report #: [id number]
                                                                    [id number]
_______________________________________________________________________________
Disclaimer
v1.1     Copyright 202 [employer]
This EMS Care Summary contains data elements from the applicable legal record 
(which may be displayed differently). It is designed to provide pertinent 
information for the following purposes: continuity of care, clinical quality, 
and state data reporting. The complete legal record is available to ED staff 
and administrators of the receiving hospital in [employer]'s Patient Tracker. All data 
is provided "as is."

## 2021-03-16 VITALS — DIASTOLIC BLOOD PRESSURE: 51 MMHG | SYSTOLIC BLOOD PRESSURE: 156 MMHG

## 2021-03-16 VITALS — SYSTOLIC BLOOD PRESSURE: 152 MMHG | DIASTOLIC BLOOD PRESSURE: 55 MMHG

## 2021-03-16 VITALS — DIASTOLIC BLOOD PRESSURE: 71 MMHG | SYSTOLIC BLOOD PRESSURE: 152 MMHG

## 2021-03-16 VITALS — SYSTOLIC BLOOD PRESSURE: 156 MMHG | DIASTOLIC BLOOD PRESSURE: 65 MMHG

## 2021-03-16 VITALS — DIASTOLIC BLOOD PRESSURE: 50 MMHG | SYSTOLIC BLOOD PRESSURE: 136 MMHG

## 2021-03-16 LAB
ANION GAP SERPL CALC-SCNC: 9 MMOL/L (ref 7–16)
BUN SERPL-MCNC: 61 MG/DL (ref 7–18)
CALCIUM SERPL-MCNC: 8.4 MG/DL (ref 8.5–10.1)
CHLORIDE SERPL-SCNC: 106 MMOL/L (ref 98–107)
CO2 SERPL-SCNC: 26 MMOL/L (ref 21–32)
CREAT SERPL-MCNC: 2.7 MG/DL (ref 0.6–1)
ERYTHROCYTE [DISTWIDTH] IN BLOOD BY AUTOMATED COUNT: 15.1 % (ref 10.5–14.5)
GLUCOSE SERPL-MCNC: 202 MG/DL (ref 74–106)
HCT VFR BLD CALC: 23.2 % (ref 37–47)
HGB BLD-MCNC: 7.5 GM/DL (ref 12–15)
MCH RBC QN AUTO: 30.6 PG (ref 26–34)
MCHC RBC AUTO-ENTMCNC: 32.4 G/DL (ref 28–37)
MCV RBC: 94.6 FL (ref 80–100)
PLATELET # BLD: 130 THOU/UL (ref 150–400)
POTASSIUM SERPL-SCNC: 4.1 MMOL/L (ref 3.5–5.1)
RBC # BLD AUTO: 2.45 MIL/UL (ref 4.2–5)
SODIUM SERPL-SCNC: 141 MMOL/L (ref 136–145)
WBC # BLD AUTO: 4.8 THOU/UL (ref 4–11)

## 2021-03-16 NOTE — NUR
Pt transferred from ED on [address] with possessions. Admission completed with pt
signing papers. VSS. assessment as charted. continue with plan of care

## 2021-03-16 NOTE — NUR
Assess due to notification of wound-left foot ulcer.  Hx right BKA, DM, CHF,
colon cancer. Advanced age 81. Was starting to eat lunch at time of visit.
Did not report any concerns with appetite and feels her wt is stable around
125-130 lb usually.  Has drank glucerna in past and agrees to 1x per day-will
order.  Low nutrition risk with appropriate nutrition interventions in place

## 2021-03-16 NOTE — NUR
Assumed pt care this am. Pt is alert & oriented. Pt is RA. Pt has IV site on R
Hand. Pt is accucheck achs. Pt is incontinent and has external cath. Pt is
difficult seeing and weak. Pt has R BKA with prothesis. Pt refused insulin
through out the shift. Daughter was at the bedside this afternoon. Pt is up
with assist x 1 and uses gaitbelt for transfer. Turn q2h.
Pt has L lateral LE diabetic ulcer.
Pt is on the bed, bed on the lowest position, side rails up x2, call
light within reach. Follow POC. Endorse night nurse.

## 2021-03-17 VITALS — SYSTOLIC BLOOD PRESSURE: 168 MMHG | DIASTOLIC BLOOD PRESSURE: 77 MMHG

## 2021-03-17 VITALS — DIASTOLIC BLOOD PRESSURE: 63 MMHG | SYSTOLIC BLOOD PRESSURE: 151 MMHG

## 2021-03-17 VITALS — SYSTOLIC BLOOD PRESSURE: 177 MMHG | DIASTOLIC BLOOD PRESSURE: 61 MMHG

## 2021-03-17 VITALS — DIASTOLIC BLOOD PRESSURE: 76 MMHG | SYSTOLIC BLOOD PRESSURE: 116 MMHG

## 2021-03-17 VITALS — DIASTOLIC BLOOD PRESSURE: 64 MMHG | SYSTOLIC BLOOD PRESSURE: 166 MMHG

## 2021-03-17 VITALS — DIASTOLIC BLOOD PRESSURE: 70 MMHG | SYSTOLIC BLOOD PRESSURE: 146 MMHG

## 2021-03-17 LAB
ANION GAP SERPL CALC-SCNC: 9 MMOL/L (ref 7–16)
BUN SERPL-MCNC: 46 MG/DL (ref 7–18)
CALCIUM SERPL-MCNC: 8.2 MG/DL (ref 8.5–10.1)
CHLORIDE SERPL-SCNC: 111 MMOL/L (ref 98–107)
CO2 SERPL-SCNC: 25 MMOL/L (ref 21–32)
CREAT SERPL-MCNC: 2.1 MG/DL (ref 0.6–1)
ERYTHROCYTE [DISTWIDTH] IN BLOOD BY AUTOMATED COUNT: 14.9 % (ref 10.5–14.5)
GLUCOSE SERPL-MCNC: 136 MG/DL (ref 74–106)
HCT VFR BLD CALC: 21.1 % (ref 37–47)
HCT VFR BLD CALC: 29.5 % (ref 37–47)
HGB BLD-MCNC: 6.8 GM/DL (ref 12–15)
HGB BLD-MCNC: 9.5 GM/DL (ref 12–15)
MCH RBC QN AUTO: 30.3 PG (ref 26–34)
MCHC RBC AUTO-ENTMCNC: 32.4 G/DL (ref 28–37)
MCV RBC: 93.6 FL (ref 80–100)
PLATELET # BLD: 116 THOU/UL (ref 150–400)
POTASSIUM SERPL-SCNC: 4.4 MMOL/L (ref 3.5–5.1)
RBC # BLD AUTO: 2.25 MIL/UL (ref 4.2–5)
SODIUM SERPL-SCNC: 145 MMOL/L (ref 136–145)
WBC # BLD AUTO: 3.8 THOU/UL (ref 4–11)

## 2021-03-17 PROCEDURE — 30233N1 TRANSFUSION OF NONAUTOLOGOUS RED BLOOD CELLS INTO PERIPHERAL VEIN, PERCUTANEOUS APPROACH: ICD-10-PCS | Performed by: HOSPITALIST

## 2021-03-17 NOTE — NUR
WOUND CONSULT;
THE LEFT HEEL IS AN OLD PRESSURE INJURY THE PATIENT HAS HAD FOR SOME TIME.
ITS SUPERFICIAL AND HAS NO OBVIOUS S/S OF INFECTION.  THE PATIENT COMMUNICATES
WELL.  THE WOUND MEASURES 6 X 1.0 X 0.1 AND SOME HYPERGRANULATION IS PRESENT.
CURRENTLY FLOATING THIS REMAINING EXTREMITY ON A PILLOW BUT WILL ORDER A PRAFO
BOOT FOR MORE EFFECTIVE OFFLOADING.  THE WOUND BED IS A HEALTHY RED.
 
RECOMMENDATIONS;
1-XEROFORM, ABD,SECURE WITH KERLIX.
2-PRAFO BOOT.
3-TURN PATIENT Q2H.
4-LOW AIR LOSS PUMP ON AT ALL TIMES.
 
DISCUSSED WITH RN

## 2021-03-17 NOTE — NUR
ON-GOING ASSESSMENT: CM REVIEWED CHART AND SPOKE WITH ATTENDING. PT IS
RECEIVING BLOOD TRANSFUSION DUE TO LOW HEMOGLOBIN. PT UNABLE TO WORK WITH
THERAPY AT THIS TIME DUE TO RECEIVING BLOOD. 5N CONSULT HAS BEEN PLACED AND
AWAITING EVALUATION AND PENDING PT/OT. CM NOTIFIED PATIENTS DAUGHTER AND
UPDATED. CM WILL CONTINUE TO FOLLOW TO ASSIST AS NEEDED.

## 2021-03-17 NOTE — NUR
ON-GOING ASSESSMENT: cm reviewed chart. PT IS RECEIVING BLOOD TODAY DUE TO LOW
HEMOGLOBIN. PT REMAINS ON IV ANBX. PHYSICAL THERAPY VARIANCED PATIENT THIS AM
DUE TO LOW HEMOGLOBIN AND GOING TO RECEIVED BLOOD. 5N CONSULT WAS PLACED AND
THEY ARE TO EVAL PATIENT. CM WILL CONTINUE TO FOLLOW TO ASSIST AS NEEDED.

## 2021-03-17 NOTE — NUR
PT WAS OBSERVED LYING ON HER BED WITH HER EYES OPEN AT START OF SHIFT.PT
ALERT/CONFUSED AND FORGETFUL.DRGS TO HER L HEEL WOUND C/D/I.PT REF INSULIN AT
HS().EXT FEMALE CATH IN PLACE.PT REPOSITIONED WHILE IN BED.L HEEL
ELEVATED WITH A PILLOW.PT WITH R BKA.PAIN MANAGED WITH MED.PT SLEEPING ON HER
BED AT THIS TIME.CALL LIGHT WTHIN REACH.

## 2021-03-17 NOTE — NUR
Assumed pt care this am. Pt is alert & oriented x3, person, plac, time.
Pt has R hand IV site. Pt has a feeder and external cath in place as request
by pt. Pt is accucheck achs. Pt has R BKA with prothesis and L lateral lower
extremity diabetic ulcer. Pt hg was 6.8. Call DPOA (daughter) for blood
transfusion consent via telephone. Pt is currently receiving blood. Will
continue to monitor pt. Pt on the bed, bed on the lowest position, side rails
up, call light within reach. Follow POC.

## 2021-03-18 VITALS — SYSTOLIC BLOOD PRESSURE: 165 MMHG | DIASTOLIC BLOOD PRESSURE: 66 MMHG

## 2021-03-18 VITALS — DIASTOLIC BLOOD PRESSURE: 68 MMHG | SYSTOLIC BLOOD PRESSURE: 160 MMHG

## 2021-03-18 VITALS — DIASTOLIC BLOOD PRESSURE: 67 MMHG | SYSTOLIC BLOOD PRESSURE: 157 MMHG

## 2021-03-18 VITALS — DIASTOLIC BLOOD PRESSURE: 104 MMHG | SYSTOLIC BLOOD PRESSURE: 205 MMHG

## 2021-03-18 VITALS — SYSTOLIC BLOOD PRESSURE: 145 MMHG | DIASTOLIC BLOOD PRESSURE: 65 MMHG

## 2021-03-18 LAB
ANION GAP SERPL CALC-SCNC: 8 MMOL/L (ref 7–16)
BUN SERPL-MCNC: 44 MG/DL (ref 7–18)
CALCIUM SERPL-MCNC: 7.9 MG/DL (ref 8.5–10.1)
CHLORIDE SERPL-SCNC: 112 MMOL/L (ref 98–107)
CO2 SERPL-SCNC: 24 MMOL/L (ref 21–32)
CREAT SERPL-MCNC: 2 MG/DL (ref 0.6–1)
ERYTHROCYTE [DISTWIDTH] IN BLOOD BY AUTOMATED COUNT: 15 % (ref 10.5–14.5)
GLUCOSE SERPL-MCNC: 161 MG/DL (ref 74–106)
HCT VFR BLD CALC: 25 % (ref 37–47)
HGB BLD-MCNC: 8.2 GM/DL (ref 12–15)
MCH RBC QN AUTO: 30.6 PG (ref 26–34)
MCHC RBC AUTO-ENTMCNC: 32.7 G/DL (ref 28–37)
MCV RBC: 93.6 FL (ref 80–100)
PLATELET # BLD: 112 THOU/UL (ref 150–400)
POTASSIUM SERPL-SCNC: 4.7 MMOL/L (ref 3.5–5.1)
RBC # BLD AUTO: 2.67 MIL/UL (ref 4.2–5)
SODIUM SERPL-SCNC: 144 MMOL/L (ref 136–145)
WBC # BLD AUTO: 4.5 THOU/UL (ref 4–11)

## 2021-03-18 NOTE — NUR
therapy evaled patient and recommends post acute care. sp with patient and
dtr. Dtr reports patient has been at [facility] in past and prefers post acute at
[facility]. Faxed referral packet. [facility] can accept. THEY can ONLY accept patient
Friday or Monday NO weekend acceptance. Patient scheduled for her COVID
vaccination on Saturday. If dc friday [facility] will inquire if they have transport
for vaccination and f/u with dtr. [facility] will need [id number] and COVID test prior
to admission.

## 2021-03-18 NOTE — NUR
ASSESSED AT START OF SHIFT. PT A&OX3 SLIGHT FORGETFUL. EVENING MEDS GIVEN AND
PT JUSTO IT WELL.IV INTACT WITH FLUIDS INFUSING. PT HAS A RBKA AND PROSTHESIS BY
BEDSIDE. EXT FEMALE CATH IN PLACE AND DRAINING. BSG CHECKED NO COVERAGE. FALL
PREC IN PLACE AND CALL LIGHT IN REACH. HGB 9.5 WILL CONT TO MONITOR.

## 2021-03-18 NOTE — NUR
ASSUMED PT CARE AROUND 0715. PT ALERT X ORIENTED X3. 1-2 PERSON ASSIST. ON
ROOM AIR. PT HAS RBKA AND PROSTHESIS AT BEDSIDE. HAS EXTERNAL CATHETER.IV RT
HAND AND SALINE LOCKED. WOUND LEFT FOOT, DRESSING CHANGE ON M/W/F.FALL
PRECAUTION IN PLACE. CALL LIGHT WITHIN REACH. WILL CONTINUE TO MONITOR.

## 2021-03-19 VITALS — SYSTOLIC BLOOD PRESSURE: 158 MMHG | DIASTOLIC BLOOD PRESSURE: 58 MMHG

## 2021-03-19 VITALS — SYSTOLIC BLOOD PRESSURE: 143 MMHG | DIASTOLIC BLOOD PRESSURE: 75 MMHG

## 2021-03-19 VITALS — DIASTOLIC BLOOD PRESSURE: 78 MMHG | SYSTOLIC BLOOD PRESSURE: 181 MMHG

## 2021-03-19 LAB
ANION GAP SERPL CALC-SCNC: 9 MMOL/L (ref 7–16)
BUN SERPL-MCNC: 35 MG/DL (ref 7–18)
CALCIUM SERPL-MCNC: 7.9 MG/DL (ref 8.5–10.1)
CHLORIDE SERPL-SCNC: 109 MMOL/L (ref 98–107)
CO2 SERPL-SCNC: 24 MMOL/L (ref 21–32)
CREAT SERPL-MCNC: 1.7 MG/DL (ref 0.6–1)
ERYTHROCYTE [DISTWIDTH] IN BLOOD BY AUTOMATED COUNT: 14.8 % (ref 10.5–14.5)
GLUCOSE SERPL-MCNC: 88 MG/DL (ref 74–106)
HCT VFR BLD CALC: 26.3 % (ref 37–47)
HGB BLD-MCNC: 8.6 GM/DL (ref 12–15)
MCH RBC QN AUTO: 30.6 PG (ref 26–34)
MCHC RBC AUTO-ENTMCNC: 32.8 G/DL (ref 28–37)
MCV RBC: 93.3 FL (ref 80–100)
PLATELET # BLD: 124 THOU/UL (ref 150–400)
POTASSIUM SERPL-SCNC: 4.7 MMOL/L (ref 3.5–5.1)
RBC # BLD AUTO: 2.81 MIL/UL (ref 4.2–5)
SODIUM SERPL-SCNC: 142 MMOL/L (ref 136–145)
WBC # BLD AUTO: 4.8 THOU/UL (ref 4–11)

## 2021-03-19 NOTE — NUR
ASSUMED PT CARE THIS AM. PT BP ELEVATED THIS AM, GIVEN BP MEDS PER EMAR.
A&OX3. PT COOPERATIVE WITH STAFF AND MAKES NEEDS KNOWN. DRESSING ON LEFT FOOT
C/D/I. PATIENT REMAINS INCONTINENT, FEMALE EXTERNAL CATHETER IN PLACE. ON ROOM
AIR. TOOK MEDS WHOLE WITHOUT ISSUE THIS AM. IV PATENT, ANTIBIOTICS INFUSED
WITHOUT ISSUE. FALL PRECAUTIONS IN PLACE.

## 2021-03-19 NOTE — NUR
ASSESSED AT START OF SHIFT PT RESTING IN BED. EVENING MEDS GIVEN AND PT JUSTO IT
WELL. BSG CHECKED 84 APPLE JUICE PROVIDED. EXT CATH IN PLACE. PT INCONTINENT.
IV INTACT AND SALINE LOCKED. REPOSITIONED FOR COMFORT. FALL PREC IN PLACE AND
CALL LIGHT AT REACH WILL CONT TO MONITOR.

## 2021-03-19 NOTE — NUR
yonatan aguilar/priscila will arrange transportation as she stated they can accept pt
today to the facility. pt and her dtr, fausto have been notified of transfer
and are agreeable to plan. us to make chart copy. cm faxed [id number] and covid
results to [phone]. rn provided number to call report [phone],
[facility].

## 2021-03-19 NOTE — NUR
cm faxed orders and covid results and [id number] to [facility] at [phone].
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
## 2021-04-04 ENCOUNTER — HOSPITAL ENCOUNTER (EMERGENCY)
Dept: HOSPITAL 35 - ER | Age: 81
Discharge: NURSING, REHAB OR LONG-TERM CARE | End: 2021-04-04
Payer: COMMERCIAL

## 2021-04-04 VITALS — SYSTOLIC BLOOD PRESSURE: 154 MMHG | DIASTOLIC BLOOD PRESSURE: 77 MMHG

## 2021-04-04 VITALS — BODY MASS INDEX: 27.6 KG/M2 | HEIGHT: 62 IN | WEIGHT: 150 LBS

## 2021-04-04 DIAGNOSIS — I50.9: ICD-10-CM

## 2021-04-04 DIAGNOSIS — Z88.8: ICD-10-CM

## 2021-04-04 DIAGNOSIS — Z79.2: ICD-10-CM

## 2021-04-04 DIAGNOSIS — E11.22: ICD-10-CM

## 2021-04-04 DIAGNOSIS — E03.9: ICD-10-CM

## 2021-04-04 DIAGNOSIS — Z91.041: ICD-10-CM

## 2021-04-04 DIAGNOSIS — I13.0: ICD-10-CM

## 2021-04-04 DIAGNOSIS — E78.5: ICD-10-CM

## 2021-04-04 DIAGNOSIS — Z91.018: ICD-10-CM

## 2021-04-04 DIAGNOSIS — E87.5: Primary | ICD-10-CM

## 2021-04-04 DIAGNOSIS — N18.30: ICD-10-CM

## 2021-04-04 DIAGNOSIS — Z79.899: ICD-10-CM

## 2021-04-04 LAB
ANION GAP SERPL CALC-SCNC: 6 MMOL/L (ref 7–16)
BUN SERPL-MCNC: 40 MG/DL (ref 7–18)
CALCIUM SERPL-MCNC: 8.8 MG/DL (ref 8.5–10.1)
CHLORIDE SERPL-SCNC: 109 MMOL/L (ref 98–107)
CO2 SERPL-SCNC: 21 MMOL/L (ref 21–32)
CREAT SERPL-MCNC: 1.9 MG/DL (ref 0.6–1)
GLUCOSE SERPL-MCNC: 214 MG/DL (ref 74–106)
POTASSIUM SERPL-SCNC: 5.4 MMOL/L (ref 3.5–5.1)
SODIUM SERPL-SCNC: 136 MMOL/L (ref 136–145)

## 2021-04-05 NOTE — EKG
[facility]
[address]
[place]  [address]
Phone:  [phone]                    ELECTROCARDIOGRAM REPORT      
_______________________________________________________________________________
 
Name:       ERMELINDA JAMESN DEAN              [facility]                     [facility]      [id number]     Account #:      [id number]  
Admission:  21    Attend Phys:                          
Discharge:  21    Date of Birth:  [birth year]  
                                                          Report #: [id number]
   [id number]
_______________________________________________________________________________
                         [facility] ED
                                       
Test Date:    2021               Test Time:    13:27:16
Pat Name:     DANYELLE JAMES           Department:   
Patient ID:   [id number]            Room:          
Gender:       F                        Technician:   
:          [id number]               Requested By: Kory Radford
Order Number: [id number] MD:   Mckay Jackson
                                 Measurements
Intervals                              Axis          
Rate:         87                       P:            61
MT:           207                      QRS:          -55
QRSD:         110                      T:            43
QT:           348                                    
QTc:          419                                    
                           Interpretive Statements
Sinus rhythm
Abnormal R-wave progression, late transition
Left ventricular hypertrophy
Compared to ECG 2021 15:22:04
Q waves no longer present
Electronically Signed On 2021 7:24:10 CDT by Mckay Jackson
[email or web address]
 
 
 
 
 
 
 
 
 
 
 
 
 
 
 
 
 
 
 
 
  <ELECTRONICALLY SIGNED>
   By: Mckay Jackson MD, [facility]    
  21     0724
D: 21 1327                           _____________________________________
T: 21                           Mckay Jackson MD, FAC      /EPI

## 2021-05-13 ENCOUNTER — HOSPITAL ENCOUNTER (INPATIENT)
Dept: HOSPITAL 35 - ER | Age: 81
LOS: 4 days | Discharge: NURSING, REHAB OR LONG-TERM CARE | DRG: 871 | End: 2021-05-17
Attending: HOSPITALIST | Admitting: HOSPITALIST
Payer: COMMERCIAL

## 2021-05-13 VITALS — DIASTOLIC BLOOD PRESSURE: 71 MMHG | SYSTOLIC BLOOD PRESSURE: 142 MMHG

## 2021-05-13 VITALS — DIASTOLIC BLOOD PRESSURE: 66 MMHG | SYSTOLIC BLOOD PRESSURE: 147 MMHG

## 2021-05-13 VITALS
WEIGHT: 154 LBS | DIASTOLIC BLOOD PRESSURE: 66 MMHG | SYSTOLIC BLOOD PRESSURE: 173 MMHG | HEIGHT: 62.01 IN | BODY MASS INDEX: 27.98 KG/M2

## 2021-05-13 VITALS — SYSTOLIC BLOOD PRESSURE: 124 MMHG | DIASTOLIC BLOOD PRESSURE: 71 MMHG

## 2021-05-13 VITALS — DIASTOLIC BLOOD PRESSURE: 67 MMHG | SYSTOLIC BLOOD PRESSURE: 155 MMHG

## 2021-05-13 DIAGNOSIS — E78.5: ICD-10-CM

## 2021-05-13 DIAGNOSIS — Z91.018: ICD-10-CM

## 2021-05-13 DIAGNOSIS — Z90.49: ICD-10-CM

## 2021-05-13 DIAGNOSIS — Z91.041: ICD-10-CM

## 2021-05-13 DIAGNOSIS — D62: ICD-10-CM

## 2021-05-13 DIAGNOSIS — N18.30: ICD-10-CM

## 2021-05-13 DIAGNOSIS — Q39.6: ICD-10-CM

## 2021-05-13 DIAGNOSIS — Z86.73: ICD-10-CM

## 2021-05-13 DIAGNOSIS — Z85.038: ICD-10-CM

## 2021-05-13 DIAGNOSIS — I13.0: ICD-10-CM

## 2021-05-13 DIAGNOSIS — A41.9: Primary | ICD-10-CM

## 2021-05-13 DIAGNOSIS — I50.9: ICD-10-CM

## 2021-05-13 DIAGNOSIS — E78.00: ICD-10-CM

## 2021-05-13 DIAGNOSIS — Z79.899: ICD-10-CM

## 2021-05-13 DIAGNOSIS — Z79.84: ICD-10-CM

## 2021-05-13 DIAGNOSIS — Z20.822: ICD-10-CM

## 2021-05-13 DIAGNOSIS — K44.9: ICD-10-CM

## 2021-05-13 DIAGNOSIS — R65.20: ICD-10-CM

## 2021-05-13 DIAGNOSIS — E11.22: ICD-10-CM

## 2021-05-13 DIAGNOSIS — J96.00: ICD-10-CM

## 2021-05-13 DIAGNOSIS — K31.9: ICD-10-CM

## 2021-05-13 DIAGNOSIS — E87.5: ICD-10-CM

## 2021-05-13 DIAGNOSIS — K25.4: ICD-10-CM

## 2021-05-13 DIAGNOSIS — N17.0: ICD-10-CM

## 2021-05-13 DIAGNOSIS — Z88.8: ICD-10-CM

## 2021-05-13 DIAGNOSIS — E03.9: ICD-10-CM

## 2021-05-13 DIAGNOSIS — N39.0: ICD-10-CM

## 2021-05-13 DIAGNOSIS — Z96.651: ICD-10-CM

## 2021-05-13 DIAGNOSIS — E11.51: ICD-10-CM

## 2021-05-13 DIAGNOSIS — J18.9: ICD-10-CM

## 2021-05-13 LAB
ALBUMIN SERPL-MCNC: 3 G/DL (ref 3.4–5)
ALT SERPL-CCNC: 21 U/L (ref 14–59)
ANION GAP SERPL CALC-SCNC: 10 MMOL/L (ref 7–16)
AST SERPL-CCNC: 12 U/L (ref 15–37)
BACTERIA-REFLEX: (no result) /HPF
BASOPHILS NFR BLD AUTO: 0.8 % (ref 0–2)
BILIRUB SERPL-MCNC: 0.2 MG/DL (ref 0.2–1)
BILIRUB UR-MCNC: NEGATIVE MG/DL
BUN SERPL-MCNC: 68 MG/DL (ref 7–18)
CALCIUM SERPL-MCNC: 8.6 MG/DL (ref 8.5–10.1)
CHLORIDE SERPL-SCNC: 108 MMOL/L (ref 98–107)
CO2 SERPL-SCNC: 21 MMOL/L (ref 21–32)
COLOR UR: YELLOW
CREAT SERPL-MCNC: 2.4 MG/DL (ref 0.6–1)
EOSINOPHIL NFR BLD: 1.6 % (ref 0–3)
ERYTHROCYTE [DISTWIDTH] IN BLOOD BY AUTOMATED COUNT: 17 % (ref 10.5–14.5)
GLUCOSE SERPL-MCNC: 246 MG/DL (ref 74–106)
GRANULOCYTES NFR BLD MANUAL: 59.1 % (ref 36–66)
HCT VFR BLD CALC: 20.2 % (ref 37–47)
HGB BLD-MCNC: 6.6 GM/DL (ref 12–15)
IRON SERPL-MCNC: 70 UG/DL (ref 50–170)
KETONES UR STRIP-MCNC: NEGATIVE MG/DL
LYMPHOCYTES NFR BLD AUTO: 26.2 % (ref 24–44)
MCH RBC QN AUTO: 31.1 PG (ref 26–34)
MCHC RBC AUTO-ENTMCNC: 32.7 G/DL (ref 28–37)
MCV RBC: 95.1 FL (ref 80–100)
MONOCYTES NFR BLD: 12.3 % (ref 1–8)
NEUTROPHILS # BLD: 3.5 THOU/UL (ref 1.4–8.2)
PLATELET # BLD: 186 THOU/UL (ref 150–400)
POTASSIUM SERPL-SCNC: 6 MMOL/L (ref 3.5–5.1)
PROT SERPL-MCNC: 7 G/DL (ref 6.4–8.2)
RBC # BLD AUTO: 2.12 MIL/UL (ref 4.2–5)
RBC # UR STRIP: (no result) /UL
RBC #/AREA URNS HPF: (no result) /HPF
SAO2 % BLD FROM PO2: 28 % (ref 20–39)
SODIUM SERPL-SCNC: 139 MMOL/L (ref 136–145)
SP GR UR STRIP: 1.02 (ref 1–1.03)
SQUAMOUS: (no result) /LPF (ref 0–3)
TIBC SERPL-MCNC: 249 UG/DL (ref 250–450)
TRANSITIONAL EPITHEL CELL: (no result) /LPF
TROPONIN I SERPL-MCNC: <0.06 NG/ML (ref ?–0.06)
URINE CLARITY: (no result)
URINE GLUCOSE-RANDOM*: NEGATIVE
URINE LEUKOCYTES-REFLEX: (no result)
URINE NITRITE-REFLEX: NEGATIVE
URINE PROTEIN (DIPSTICK): (no result)
UROBILINOGEN UR STRIP-ACNC: 0.2 E.U./DL (ref 0.2–1)
WBC # BLD AUTO: 5.9 THOU/UL (ref 4–11)

## 2021-05-13 PROCEDURE — 10081 I&D PILONIDAL CYST COMP: CPT

## 2021-05-13 PROCEDURE — 30233N1 TRANSFUSION OF NONAUTOLOGOUS RED BLOOD CELLS INTO PERIPHERAL VEIN, PERCUTANEOUS APPROACH: ICD-10-PCS | Performed by: HOSPITALIST

## 2021-05-13 PROCEDURE — 62900: CPT

## 2021-05-13 PROCEDURE — 50010 RENAL EXPLORATION: CPT

## 2021-05-13 PROCEDURE — 62110: CPT

## 2021-05-13 PROCEDURE — 70005: CPT

## 2021-05-14 VITALS — DIASTOLIC BLOOD PRESSURE: 62 MMHG | SYSTOLIC BLOOD PRESSURE: 154 MMHG

## 2021-05-14 VITALS — DIASTOLIC BLOOD PRESSURE: 71 MMHG | SYSTOLIC BLOOD PRESSURE: 177 MMHG

## 2021-05-14 VITALS — DIASTOLIC BLOOD PRESSURE: 66 MMHG | SYSTOLIC BLOOD PRESSURE: 167 MMHG

## 2021-05-14 VITALS — DIASTOLIC BLOOD PRESSURE: 72 MMHG | SYSTOLIC BLOOD PRESSURE: 169 MMHG

## 2021-05-14 LAB
ANION GAP SERPL CALC-SCNC: 11 MMOL/L (ref 7–16)
BUN SERPL-MCNC: 60 MG/DL (ref 7–18)
CALCIUM SERPL-MCNC: 8.9 MG/DL (ref 8.5–10.1)
CHLORIDE SERPL-SCNC: 110 MMOL/L (ref 98–107)
CO2 SERPL-SCNC: 17 MMOL/L (ref 21–32)
CREAT SERPL-MCNC: 2 MG/DL (ref 0.6–1)
ERYTHROCYTE [DISTWIDTH] IN BLOOD BY AUTOMATED COUNT: 16.2 % (ref 10.5–14.5)
GLUCOSE SERPL-MCNC: 194 MG/DL (ref 74–106)
HCT VFR BLD CALC: 29.2 % (ref 37–47)
HGB BLD-MCNC: 9.9 GM/DL (ref 12–15)
MCH RBC QN AUTO: 31.3 PG (ref 26–34)
MCHC RBC AUTO-ENTMCNC: 34 G/DL (ref 28–37)
MCV RBC: 92.1 FL (ref 80–100)
PLATELET # BLD: 163 THOU/UL (ref 150–400)
POTASSIUM SERPL-SCNC: 5.2 MMOL/L (ref 3.5–5.1)
RBC # BLD AUTO: 3.17 MIL/UL (ref 4.2–5)
SODIUM SERPL-SCNC: 138 MMOL/L (ref 136–145)
WBC # BLD AUTO: 8.2 THOU/UL (ref 4–11)

## 2021-05-14 PROCEDURE — 0DB68ZX EXCISION OF STOMACH, VIA NATURAL OR ARTIFICIAL OPENING ENDOSCOPIC, DIAGNOSTIC: ICD-10-PCS

## 2021-05-14 NOTE — EKG
[place] [address] [facility] [employer]
[place]  [address]
Phone:  [phone]                    ELECTROCARDIOGRAM REPORT      
_______________________________________________________________________________
 
Name:       JACOBDANYELLE L              Room #:         217-P       ADM IN  
M.R.#:      [id number]     Account #:      [id number]  
Admission:  21    Attend Phys:    Ernesto Klein
Discharge:              Date of Birth:  [birth year]  
                                                          Report #: [id number]
   [id number]
_______________________________________________________________________________
                         [facility] ED
                                       
Test Date:    2021               Test Time:    18:39:49
Pat Name:     DANYELLE JAMES           Department:   
Patient ID:   [id number]            Room:         [facility]
Gender:       F                        Technician:   SRI
:          [id number]               Requested By: Hussain Glasgow
Order Number: [id number] MD:   Mckay Jackson
                                 Measurements
Intervals                              Axis          
Rate:         74                       P:            62
MS:           247                      QRS:          -47
QRSD:         122                      T:            37
QT:           399                                    
QTc:          443                                    
                           Interpretive Statements
Sinus rhythm
Prolonged MS interval
Nonspecific IVCD with LAD
Left ventricular hypertrophy
Compared to ECG 2021 13:27:16
First degree AV block now present
Intraventricular conduction delay now present
Electronically Signed On 2021 6:48:26 CDT by Mckay Jackson
[email or web address]
 
 
 
 
 
 
 
 
 
 
 
 
 
 
 
 
 
 
  <ELECTRONICALLY SIGNED>
   By: Mckay Jackson MD, [facility]    
  21     0648
D: 21 1839                           _____________________________________
T: 21 1839                           Mckay Jackson MD, [facility]      /EPI

## 2021-05-15 VITALS — SYSTOLIC BLOOD PRESSURE: 172 MMHG | DIASTOLIC BLOOD PRESSURE: 66 MMHG

## 2021-05-15 VITALS — SYSTOLIC BLOOD PRESSURE: 149 MMHG | DIASTOLIC BLOOD PRESSURE: 76 MMHG

## 2021-05-15 VITALS — SYSTOLIC BLOOD PRESSURE: 149 MMHG | DIASTOLIC BLOOD PRESSURE: 64 MMHG

## 2021-05-15 VITALS — DIASTOLIC BLOOD PRESSURE: 72 MMHG | SYSTOLIC BLOOD PRESSURE: 134 MMHG

## 2021-05-15 VITALS — DIASTOLIC BLOOD PRESSURE: 73 MMHG | SYSTOLIC BLOOD PRESSURE: 168 MMHG

## 2021-05-15 LAB
ANION GAP SERPL CALC-SCNC: 11 MMOL/L (ref 7–16)
BUN SERPL-MCNC: 55 MG/DL (ref 7–18)
CALCIUM SERPL-MCNC: 8.8 MG/DL (ref 8.5–10.1)
CHLORIDE SERPL-SCNC: 112 MMOL/L (ref 98–107)
CO2 SERPL-SCNC: 18 MMOL/L (ref 21–32)
CREAT SERPL-MCNC: 1.8 MG/DL (ref 0.6–1)
ERYTHROCYTE [DISTWIDTH] IN BLOOD BY AUTOMATED COUNT: 16.3 % (ref 10.5–14.5)
GLUCOSE SERPL-MCNC: 229 MG/DL (ref 74–106)
HCT VFR BLD CALC: 25.9 % (ref 37–47)
HGB BLD-MCNC: 8.7 GM/DL (ref 12–15)
MAGNESIUM SERPL-MCNC: 1.7 MG/DL (ref 1.8–2.4)
MCH RBC QN AUTO: 31.1 PG (ref 26–34)
MCHC RBC AUTO-ENTMCNC: 33.6 G/DL (ref 28–37)
MCV RBC: 92.7 FL (ref 80–100)
PLATELET # BLD: 141 THOU/UL (ref 150–400)
POTASSIUM SERPL-SCNC: 5.1 MMOL/L (ref 3.5–5.1)
RBC # BLD AUTO: 2.79 MIL/UL (ref 4.2–5)
SODIUM SERPL-SCNC: 141 MMOL/L (ref 136–145)
WBC # BLD AUTO: 6.9 THOU/UL (ref 4–11)

## 2021-05-16 VITALS — SYSTOLIC BLOOD PRESSURE: 153 MMHG | DIASTOLIC BLOOD PRESSURE: 73 MMHG

## 2021-05-16 VITALS — DIASTOLIC BLOOD PRESSURE: 79 MMHG | SYSTOLIC BLOOD PRESSURE: 159 MMHG

## 2021-05-16 VITALS — DIASTOLIC BLOOD PRESSURE: 73 MMHG | SYSTOLIC BLOOD PRESSURE: 135 MMHG

## 2021-05-16 VITALS — DIASTOLIC BLOOD PRESSURE: 60 MMHG | SYSTOLIC BLOOD PRESSURE: 155 MMHG

## 2021-05-16 VITALS — DIASTOLIC BLOOD PRESSURE: 74 MMHG | SYSTOLIC BLOOD PRESSURE: 172 MMHG

## 2021-05-16 LAB
ANION GAP SERPL CALC-SCNC: 11 MMOL/L (ref 7–16)
BUN SERPL-MCNC: 58 MG/DL (ref 7–18)
CALCIUM SERPL-MCNC: 8.4 MG/DL (ref 8.5–10.1)
CHLORIDE SERPL-SCNC: 111 MMOL/L (ref 98–107)
CO2 SERPL-SCNC: 20 MMOL/L (ref 21–32)
CREAT SERPL-MCNC: 2 MG/DL (ref 0.6–1)
ERYTHROCYTE [DISTWIDTH] IN BLOOD BY AUTOMATED COUNT: 16.3 % (ref 10.5–14.5)
GLUCOSE SERPL-MCNC: 91 MG/DL (ref 74–106)
HCT VFR BLD CALC: 23 % (ref 37–47)
HGB BLD-MCNC: 7.7 GM/DL (ref 12–15)
MAGNESIUM SERPL-MCNC: 1.7 MG/DL (ref 1.8–2.4)
MCH RBC QN AUTO: 31.1 PG (ref 26–34)
MCHC RBC AUTO-ENTMCNC: 33.4 G/DL (ref 28–37)
MCV RBC: 93.1 FL (ref 80–100)
PLATELET # BLD: 141 THOU/UL (ref 150–400)
POTASSIUM SERPL-SCNC: 5.2 MMOL/L (ref 3.5–5.1)
RBC # BLD AUTO: 2.47 MIL/UL (ref 4.2–5)
SODIUM SERPL-SCNC: 142 MMOL/L (ref 136–145)
WBC # BLD AUTO: 4.5 THOU/UL (ref 4–11)

## 2021-05-17 VITALS — DIASTOLIC BLOOD PRESSURE: 75 MMHG | SYSTOLIC BLOOD PRESSURE: 150 MMHG

## 2021-05-17 VITALS — SYSTOLIC BLOOD PRESSURE: 143 MMHG | DIASTOLIC BLOOD PRESSURE: 61 MMHG

## 2021-05-17 VITALS — DIASTOLIC BLOOD PRESSURE: 50 MMHG | SYSTOLIC BLOOD PRESSURE: 149 MMHG

## 2021-05-17 VITALS — SYSTOLIC BLOOD PRESSURE: 166 MMHG | DIASTOLIC BLOOD PRESSURE: 57 MMHG

## 2021-05-17 LAB
ANION GAP SERPL CALC-SCNC: 10 MMOL/L (ref 7–16)
BUN SERPL-MCNC: 51 MG/DL (ref 7–18)
CALCIUM SERPL-MCNC: 8.2 MG/DL (ref 8.5–10.1)
CHLORIDE SERPL-SCNC: 112 MMOL/L (ref 98–107)
CO2 SERPL-SCNC: 22 MMOL/L (ref 21–32)
CREAT SERPL-MCNC: 1.9 MG/DL (ref 0.6–1)
ERYTHROCYTE [DISTWIDTH] IN BLOOD BY AUTOMATED COUNT: 16.2 % (ref 10.5–14.5)
GLUCOSE SERPL-MCNC: 100 MG/DL (ref 74–106)
HCT VFR BLD CALC: 22.8 % (ref 37–47)
HGB BLD-MCNC: 7.7 GM/DL (ref 12–15)
MAGNESIUM SERPL-MCNC: 1.7 MG/DL (ref 1.8–2.4)
MCH RBC QN AUTO: 31.2 PG (ref 26–34)
MCHC RBC AUTO-ENTMCNC: 33.6 G/DL (ref 28–37)
MCV RBC: 92.9 FL (ref 80–100)
PLATELET # BLD: 142 THOU/UL (ref 150–400)
POTASSIUM SERPL-SCNC: 4.9 MMOL/L (ref 3.5–5.1)
RBC # BLD AUTO: 2.45 MIL/UL (ref 4.2–5)
SODIUM SERPL-SCNC: 144 MMOL/L (ref 136–145)
WBC # BLD AUTO: 4.6 THOU/UL (ref 4–11)

## 2021-05-17 NOTE — PATH
[facility]
[address]
[place]   [address]                     PATHOLOGY RPT PROCEDURE       
_______________________________________________________________________________
 
Name:       ERMELINDA JAMESN L              Room #:         217-P       DIS IN  
M.R.#:      [id number]     Account #:      [id number]  
Admission:  05/13/21    Date of Birth:  [birth year]  
Discharge:  05/17/21                                    Report #:    [id number]
                                                        Path Case #: [id number]
_______________________________________________________________________________
 
[facility] Accession Number: [id number]
.                                                                01
Material submitted:                                        .
stomach - ANTRAL BX
.                                                                01
Clinical history:                                          .
R/O H.PYLORI
.                                                                02
**********************************************************************
Diagnosis:
Gastric mucosa, antrum to rule out H. pylori, endoscopic biopsy:
- Mild reactive gastropathy.
- Negative for intestinal metaplasia or atrophy.
- Negative for Helicobacter pylori (properly controlled
immunohistochemical stain performed).
(IUV:; 05/17/2021)
MBR  05/17/2021  1213 Local
**********************************************************************
.                                                                02
Electronically signed:                                     .
Kathe Robbins MD, Pathologist
NPI- [id number]
.                                                                01
Gross description:                                         .
Received in formalin labeled "Zebon, Suzette, antral biopsy rule H.
pylori" are 2 fragments of tan-brown soft tissue measuring 0.6 x 0.4 x 0.3
cm and 0.5 x 0.3 x 0.2 cm. The specimen is submitted entirely in A1. ([facility];
5/15/2021)
GZA/GZA  05/15/2021  1142 Local
.                                                                02
Pathologist provided ICD-10:
K31.9
.                                                                02
CPT                                                        .
901794, T27648
Specimen Comment: A courtesy copy of this report has been sent to [phone]
[phone]
Specimen Comment: [phone], [phone]
Specimen Comment: Report sent to ,DR WILLIS / DR LILLIG
***Performed at:  01
   [facility] [address], [place], [place]  [id number]
   MD Calderon Borden MD Phone:  [phone]
***Performed at:  02
   [facility] [address]  [id number]
   MD Kathe Robbins MD Phone:  [phone]

## 2021-06-03 ENCOUNTER — HOSPITAL ENCOUNTER (INPATIENT)
Dept: HOSPITAL 35 - ER | Age: 81
LOS: 10 days | DRG: 870 | End: 2021-06-13
Attending: INTERNAL MEDICINE | Admitting: INTERNAL MEDICINE
Payer: COMMERCIAL

## 2021-06-03 VITALS — WEIGHT: 192.68 LBS | BODY MASS INDEX: 34.14 KG/M2 | HEIGHT: 62.99 IN

## 2021-06-03 VITALS — SYSTOLIC BLOOD PRESSURE: 131 MMHG | DIASTOLIC BLOOD PRESSURE: 55 MMHG

## 2021-06-03 VITALS — SYSTOLIC BLOOD PRESSURE: 182 MMHG | DIASTOLIC BLOOD PRESSURE: 79 MMHG

## 2021-06-03 VITALS — DIASTOLIC BLOOD PRESSURE: 59 MMHG | SYSTOLIC BLOOD PRESSURE: 131 MMHG

## 2021-06-03 VITALS — SYSTOLIC BLOOD PRESSURE: 87 MMHG | DIASTOLIC BLOOD PRESSURE: 43 MMHG

## 2021-06-03 VITALS — SYSTOLIC BLOOD PRESSURE: 141 MMHG | DIASTOLIC BLOOD PRESSURE: 57 MMHG

## 2021-06-03 VITALS — DIASTOLIC BLOOD PRESSURE: 56 MMHG | SYSTOLIC BLOOD PRESSURE: 138 MMHG

## 2021-06-03 VITALS — DIASTOLIC BLOOD PRESSURE: 43 MMHG | SYSTOLIC BLOOD PRESSURE: 133 MMHG

## 2021-06-03 VITALS — DIASTOLIC BLOOD PRESSURE: 64 MMHG | SYSTOLIC BLOOD PRESSURE: 145 MMHG

## 2021-06-03 VITALS — SYSTOLIC BLOOD PRESSURE: 133 MMHG | DIASTOLIC BLOOD PRESSURE: 56 MMHG

## 2021-06-03 VITALS — SYSTOLIC BLOOD PRESSURE: 130 MMHG | DIASTOLIC BLOOD PRESSURE: 51 MMHG

## 2021-06-03 VITALS — SYSTOLIC BLOOD PRESSURE: 184 MMHG | DIASTOLIC BLOOD PRESSURE: 78 MMHG

## 2021-06-03 VITALS — SYSTOLIC BLOOD PRESSURE: 134 MMHG | DIASTOLIC BLOOD PRESSURE: 65 MMHG

## 2021-06-03 VITALS — DIASTOLIC BLOOD PRESSURE: 47 MMHG | SYSTOLIC BLOOD PRESSURE: 113 MMHG

## 2021-06-03 VITALS — DIASTOLIC BLOOD PRESSURE: 77 MMHG | SYSTOLIC BLOOD PRESSURE: 146 MMHG

## 2021-06-03 VITALS — SYSTOLIC BLOOD PRESSURE: 123 MMHG | DIASTOLIC BLOOD PRESSURE: 59 MMHG

## 2021-06-03 VITALS — DIASTOLIC BLOOD PRESSURE: 61 MMHG | SYSTOLIC BLOOD PRESSURE: 142 MMHG

## 2021-06-03 VITALS — SYSTOLIC BLOOD PRESSURE: 143 MMHG | DIASTOLIC BLOOD PRESSURE: 66 MMHG

## 2021-06-03 VITALS — DIASTOLIC BLOOD PRESSURE: 57 MMHG | SYSTOLIC BLOOD PRESSURE: 122 MMHG

## 2021-06-03 VITALS — DIASTOLIC BLOOD PRESSURE: 71 MMHG | SYSTOLIC BLOOD PRESSURE: 156 MMHG

## 2021-06-03 VITALS — SYSTOLIC BLOOD PRESSURE: 136 MMHG | DIASTOLIC BLOOD PRESSURE: 45 MMHG

## 2021-06-03 VITALS — SYSTOLIC BLOOD PRESSURE: 127 MMHG | DIASTOLIC BLOOD PRESSURE: 34 MMHG

## 2021-06-03 VITALS — DIASTOLIC BLOOD PRESSURE: 51 MMHG | SYSTOLIC BLOOD PRESSURE: 134 MMHG

## 2021-06-03 VITALS — DIASTOLIC BLOOD PRESSURE: 52 MMHG | SYSTOLIC BLOOD PRESSURE: 151 MMHG

## 2021-06-03 VITALS — DIASTOLIC BLOOD PRESSURE: 45 MMHG | SYSTOLIC BLOOD PRESSURE: 128 MMHG

## 2021-06-03 VITALS — SYSTOLIC BLOOD PRESSURE: 177 MMHG | DIASTOLIC BLOOD PRESSURE: 72 MMHG

## 2021-06-03 VITALS — DIASTOLIC BLOOD PRESSURE: 45 MMHG | SYSTOLIC BLOOD PRESSURE: 131 MMHG

## 2021-06-03 VITALS — DIASTOLIC BLOOD PRESSURE: 55 MMHG | SYSTOLIC BLOOD PRESSURE: 109 MMHG

## 2021-06-03 VITALS — SYSTOLIC BLOOD PRESSURE: 97 MMHG | DIASTOLIC BLOOD PRESSURE: 36 MMHG

## 2021-06-03 VITALS — DIASTOLIC BLOOD PRESSURE: 51 MMHG | SYSTOLIC BLOOD PRESSURE: 144 MMHG

## 2021-06-03 VITALS — DIASTOLIC BLOOD PRESSURE: 63 MMHG | SYSTOLIC BLOOD PRESSURE: 122 MMHG

## 2021-06-03 VITALS — DIASTOLIC BLOOD PRESSURE: 62 MMHG | SYSTOLIC BLOOD PRESSURE: 145 MMHG

## 2021-06-03 VITALS — SYSTOLIC BLOOD PRESSURE: 139 MMHG | DIASTOLIC BLOOD PRESSURE: 47 MMHG

## 2021-06-03 VITALS — SYSTOLIC BLOOD PRESSURE: 91 MMHG | DIASTOLIC BLOOD PRESSURE: 43 MMHG

## 2021-06-03 VITALS — DIASTOLIC BLOOD PRESSURE: 45 MMHG | SYSTOLIC BLOOD PRESSURE: 90 MMHG

## 2021-06-03 VITALS — SYSTOLIC BLOOD PRESSURE: 147 MMHG | DIASTOLIC BLOOD PRESSURE: 51 MMHG

## 2021-06-03 VITALS — DIASTOLIC BLOOD PRESSURE: 63 MMHG | SYSTOLIC BLOOD PRESSURE: 123 MMHG

## 2021-06-03 VITALS — SYSTOLIC BLOOD PRESSURE: 115 MMHG | DIASTOLIC BLOOD PRESSURE: 57 MMHG

## 2021-06-03 VITALS — SYSTOLIC BLOOD PRESSURE: 167 MMHG | DIASTOLIC BLOOD PRESSURE: 64 MMHG

## 2021-06-03 VITALS — SYSTOLIC BLOOD PRESSURE: 161 MMHG | DIASTOLIC BLOOD PRESSURE: 62 MMHG

## 2021-06-03 VITALS — SYSTOLIC BLOOD PRESSURE: 127 MMHG | DIASTOLIC BLOOD PRESSURE: 49 MMHG

## 2021-06-03 VITALS — SYSTOLIC BLOOD PRESSURE: 110 MMHG | DIASTOLIC BLOOD PRESSURE: 53 MMHG

## 2021-06-03 VITALS — DIASTOLIC BLOOD PRESSURE: 62 MMHG | SYSTOLIC BLOOD PRESSURE: 130 MMHG

## 2021-06-03 VITALS — SYSTOLIC BLOOD PRESSURE: 156 MMHG | DIASTOLIC BLOOD PRESSURE: 70 MMHG

## 2021-06-03 VITALS — SYSTOLIC BLOOD PRESSURE: 144 MMHG | DIASTOLIC BLOOD PRESSURE: 61 MMHG

## 2021-06-03 VITALS — SYSTOLIC BLOOD PRESSURE: 164 MMHG | DIASTOLIC BLOOD PRESSURE: 76 MMHG

## 2021-06-03 VITALS — SYSTOLIC BLOOD PRESSURE: 91 MMHG | DIASTOLIC BLOOD PRESSURE: 41 MMHG

## 2021-06-03 VITALS — DIASTOLIC BLOOD PRESSURE: 50 MMHG | SYSTOLIC BLOOD PRESSURE: 127 MMHG

## 2021-06-03 VITALS — SYSTOLIC BLOOD PRESSURE: 125 MMHG | DIASTOLIC BLOOD PRESSURE: 53 MMHG

## 2021-06-03 VITALS — DIASTOLIC BLOOD PRESSURE: 39 MMHG | SYSTOLIC BLOOD PRESSURE: 79 MMHG

## 2021-06-03 VITALS — SYSTOLIC BLOOD PRESSURE: 145 MMHG | DIASTOLIC BLOOD PRESSURE: 55 MMHG

## 2021-06-03 VITALS — DIASTOLIC BLOOD PRESSURE: 58 MMHG | SYSTOLIC BLOOD PRESSURE: 157 MMHG

## 2021-06-03 DIAGNOSIS — A41.9: Primary | ICD-10-CM

## 2021-06-03 DIAGNOSIS — I13.0: ICD-10-CM

## 2021-06-03 DIAGNOSIS — E78.00: ICD-10-CM

## 2021-06-03 DIAGNOSIS — E66.01: ICD-10-CM

## 2021-06-03 DIAGNOSIS — Z90.49: ICD-10-CM

## 2021-06-03 DIAGNOSIS — Z88.8: ICD-10-CM

## 2021-06-03 DIAGNOSIS — G93.41: ICD-10-CM

## 2021-06-03 DIAGNOSIS — E11.22: ICD-10-CM

## 2021-06-03 DIAGNOSIS — J69.0: ICD-10-CM

## 2021-06-03 DIAGNOSIS — E11.51: ICD-10-CM

## 2021-06-03 DIAGNOSIS — R04.0: ICD-10-CM

## 2021-06-03 DIAGNOSIS — Z66: ICD-10-CM

## 2021-06-03 DIAGNOSIS — Z79.84: ICD-10-CM

## 2021-06-03 DIAGNOSIS — N18.30: ICD-10-CM

## 2021-06-03 DIAGNOSIS — Z79.899: ICD-10-CM

## 2021-06-03 DIAGNOSIS — Z91.018: ICD-10-CM

## 2021-06-03 DIAGNOSIS — Z51.5: ICD-10-CM

## 2021-06-03 DIAGNOSIS — J96.21: ICD-10-CM

## 2021-06-03 DIAGNOSIS — Z85.038: ICD-10-CM

## 2021-06-03 DIAGNOSIS — G93.1: ICD-10-CM

## 2021-06-03 DIAGNOSIS — I95.9: ICD-10-CM

## 2021-06-03 DIAGNOSIS — I27.20: ICD-10-CM

## 2021-06-03 DIAGNOSIS — N17.0: ICD-10-CM

## 2021-06-03 DIAGNOSIS — I46.9: ICD-10-CM

## 2021-06-03 DIAGNOSIS — Z91.041: ICD-10-CM

## 2021-06-03 DIAGNOSIS — I50.32: ICD-10-CM

## 2021-06-03 DIAGNOSIS — E03.9: ICD-10-CM

## 2021-06-03 DIAGNOSIS — Z86.73: ICD-10-CM

## 2021-06-03 LAB
ALBUMIN SERPL-MCNC: 2.3 G/DL (ref 3.4–5)
ALT SERPL-CCNC: 672 U/L (ref 30–65)
ANION GAP SERPL CALC-SCNC: 17 MMOL/L (ref 7–16)
ANION GAP SERPL CALC-SCNC: 9 MMOL/L (ref 7–16)
ANISOCYTOSIS BLD QL SMEAR: (no result)
AST SERPL-CCNC: 878 U/L (ref 15–37)
BACTERIA-REFLEX: (no result) /HPF
BASOPHILS NFR BLD AUTO: 0 % (ref 0–2)
BASOPHILS NFR BLD AUTO: 0.3 % (ref 0–2)
BE(VIVO): -14.5 MMOL/L
BE(VIVO): -20.8 MMOL/L
BE(VIVO): -6.5 MMOL/L
BILIRUB SERPL-MCNC: 0.5 MG/DL (ref 0.2–1)
BILIRUB UR-MCNC: NEGATIVE MG/DL
BUN SERPL-MCNC: 60 MG/DL (ref 7–18)
BUN SERPL-MCNC: 75 MG/DL (ref 7–18)
CALCIUM SERPL-MCNC: 8.2 MG/DL (ref 8.5–10.1)
CALCIUM SERPL-MCNC: 8.6 MG/DL (ref 8.5–10.1)
CHLORIDE SERPL-SCNC: 111 MMOL/L (ref 98–107)
CHLORIDE SERPL-SCNC: 114 MMOL/L (ref 98–107)
CO2 SERPL-SCNC: 12 MMOL/L (ref 21–32)
CO2 SERPL-SCNC: 21 MMOL/L (ref 21–32)
COLOR UR: YELLOW
CREAT SERPL-MCNC: 2.2 MG/DL (ref 0.6–1)
CREAT SERPL-MCNC: 2.4 MG/DL (ref 0.6–1)
D DIMER PPP FEU-MCNC: > 35.2 UG/MLFEU (ref 0.19–0.5)
EOSINOPHIL NFR BLD: 0.1 % (ref 0–3)
EOSINOPHIL NFR BLD: 1 % (ref 0–3)
ERYTHROCYTE [DISTWIDTH] IN BLOOD BY AUTOMATED COUNT: 16.6 % (ref 10.5–14.5)
ERYTHROCYTE [DISTWIDTH] IN BLOOD BY AUTOMATED COUNT: 17.6 % (ref 10.5–14.5)
FIBRINOGEN PPP-MCNC: 231 MG/DL (ref 201–437)
GLUCOSE SERPL-MCNC: 240 MG/DL (ref 74–106)
GLUCOSE SERPL-MCNC: 377 MG/DL (ref 74–106)
GRANULOCYTES NFR BLD MANUAL: 54 % (ref 36–66)
GRANULOCYTES NFR BLD MANUAL: 84.5 % (ref 36–66)
HCO3 BLD-SCNC: 13.1 MMOL/L (ref 22–26)
HCO3 BLD-SCNC: 17.9 MMOL/L (ref 22–26)
HCO3 BLD-SCNC: 9.5 MMOL/L (ref 22–26)
HCT VFR BLD CALC: 21 % (ref 37–47)
HCT VFR BLD CALC: 22.3 % (ref 37–47)
HGB BLD-MCNC: 6.4 GM/DL (ref 12–15)
HGB BLD-MCNC: 7.4 GM/DL (ref 12–15)
KETONES UR STRIP-MCNC: NEGATIVE MG/DL
LYMPHOCYTES NFR BLD AUTO: 38 % (ref 24–44)
LYMPHOCYTES NFR BLD AUTO: 6.3 % (ref 24–44)
MAGNESIUM SERPL-MCNC: 2.1 MG/DL (ref 1.8–2.4)
MAGNESIUM SERPL-MCNC: 2.5 MG/DL (ref 1.8–2.4)
MCH RBC QN AUTO: 30.8 PG (ref 26–34)
MCH RBC QN AUTO: 31 PG (ref 26–34)
MCHC RBC AUTO-ENTMCNC: 30.5 G/DL (ref 28–37)
MCHC RBC AUTO-ENTMCNC: 33.1 G/DL (ref 28–37)
MCV RBC: 101.6 FL (ref 80–100)
MCV RBC: 93.2 FL (ref 80–100)
MONOCYTES NFR BLD: 6 % (ref 1–8)
MONOCYTES NFR BLD: 8.8 % (ref 1–8)
NEUTROPHILS # BLD: 6.1 THOU/UL (ref 1.4–8.2)
NEUTROPHILS # BLD: 8.5 THOU/UL (ref 1.4–8.2)
NEUTS BAND NFR BLD: 1 % (ref 0–8)
NUCLEATED RBCS: 2 /100WBC
PCO2 BLD: 31.5 MMHG (ref 35–45)
PCO2 BLD: 37.8 MMHG (ref 35–45)
PCO2 BLD: 42.7 MMHG (ref 35–45)
PLATELET # BLD EST: NORMAL 10*3/UL
PLATELET # BLD: 159 THOU/UL (ref 150–400)
PLATELET # BLD: 190 THOU/UL (ref 150–400)
PO2 BLD: 147.3 MMHG (ref 80–100)
PO2 BLD: 315.8 MMHG (ref 80–100)
PO2 BLD: 64.3 MMHG (ref 80–100)
POTASSIUM SERPL-SCNC: 4.5 MMOL/L (ref 3.5–5.1)
POTASSIUM SERPL-SCNC: 6.7 MMOL/L (ref 3.5–5.1)
PROT SERPL-MCNC: 5.7 G/DL (ref 6.4–8.2)
RBC # BLD AUTO: 2.07 MIL/UL (ref 4.2–5)
RBC # BLD AUTO: 2.4 MIL/UL (ref 4.2–5)
RBC # UR STRIP: (no result) /UL
SODIUM SERPL-SCNC: 140 MMOL/L (ref 136–145)
SODIUM SERPL-SCNC: 144 MMOL/L (ref 136–145)
SP GR UR STRIP: 1.02 (ref 1–1.03)
SQUAMOUS: (no result) /LPF (ref 0–3)
TROPONIN I SERPL-MCNC: 0.23 NG/ML (ref ?–0.06)
TROPONIN I SERPL-MCNC: <0.06 NG/ML (ref ?–0.06)
URINE CLARITY: (no result)
URINE GLUCOSE-RANDOM*: NEGATIVE
URINE LEUKOCYTES-REFLEX: (no result)
URINE NITRITE-REFLEX: POSITIVE
URINE PROTEIN (DIPSTICK): (no result)
URINE WBC-REFLEX: (no result) /HPF (ref 0–5)
UROBILINOGEN UR STRIP-ACNC: 0.2 E.U./DL (ref 0.2–1)
WBC # BLD AUTO: 10 THOU/UL (ref 4–11)
WBC # BLD AUTO: 11.1 THOU/UL (ref 4–11)

## 2021-06-03 PROCEDURE — 65040: CPT

## 2021-06-03 PROCEDURE — 85076: CPT

## 2021-06-03 PROCEDURE — 10078: CPT

## 2021-06-03 NOTE — NUR
A #6F TRIPLE LUMEN POWER INJECTABLE CENTRAL LINE WAS PLACED PER POLICY AFTER A
TIMEOUT WAS COMPLETED. CONSENT AND ORDER WAS NOTED. THE PATIENT WAS NOT
RESPONSIVE TO TEACHING. THE RIGHT JUGULAR WAS WIDLEY PATIENT. THE 6F LINE WAS
25CM AND ADVANCED TO 6CM EXTERNAL WITHOUT DIFFICULTY. A STAT CHEST XRAY
CONFIRMED LINE IN GOOD POSITION AND RELEASED FOR USE

## 2021-06-03 NOTE — NUR
Plan is to give a NS bolus.
Will ask for some mild sedation pt's BP seems to be increasing.
will cont to monitor.
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
## 2021-06-03 NOTE — NUR
PT HEART RATE NOW 39-40 ON THE MONITOR, ED PROVIDER NOTIFIED. THIS RN REMAINS
AT BEDSIDE. NO FURTHER CHANGES TO NOTE

## 2021-06-03 NOTE — NUR
Admitted to the ICU per cart intubated, unresponsive. Plan is to start
Hypothermia Protcol. Pt assessment completed and the pads and temp probes
placed and the Protocol started at 1230. Call to Dr Fong and he came to
assess the patient orders recieved. Pt presently in NSR with a BP of 127/34
temp is already 34.4 already. ABG done and reported to Dr Fong.
Will cont to monitor.

## 2021-06-03 NOTE — EKG
[place] [address] [facility]
[place]  [address]
Phone:  [phone]                    ELECTROCARDIOGRAM REPORT      
_______________________________________________________________________________
 
Name:       BERTHA JAMESLYN DEAN              Room #:         242-P       ADM IN  
.R.#:      [id number]     Account #:      [id number]  
Admission:  21    Attend Phys:    Yan Bob MD      
Discharge:              Date of Birth:  [birth year]  
                                                          Report #: [id number]
   [id number]
_______________________________________________________________________________
                         [facility] ED
                                       
Test Date:    2021               Test Time:    08:59:37
Pat Name:     DANYELLE JAMES           Department:   
Patient ID:   [id number]            Room:         242
Gender:       F                        Technician:   ELSA
:          1940               Requested By: Shashi Gil
Order Number: [id number] MD:   Mckay Jackson
                                 Measurements
Intervals                              Axis          
Rate:         47                       P:            40
TX:           199                      QRS:          -65
QRSD:         151                      T:            26
QT:           546                                    
QTc:          483                                    
                           Interpretive Statements
Sinus bradycardia
RBBB and LAFB
Left ventricular hypertrophy
Compared to ECG 2021 18:39:49
Left anterior fascicular block now present
Right bundle-branch block now present
Sinus rhythm no longer present
First degree AV block no longer present
Intraventricular conduction delay no longer present
Electronically Signed On 6-3-2021 12:10:46 CDT by Mckay Jackson
[email or web address]
 
 
 
 
 
 
 
 
 
 
 
 
 
 
 
 
  <ELECTRONICALLY SIGNED>
   By: Mckay Jackson MD, [facility]    
  21     1210
D: 21 0859                           _____________________________________
T: 21 0859                           Mckay Jackson MD, [facility]      /EPI

## 2021-06-03 NOTE — NUR
Pt's brother and sister here and reason for the Central line explained.
Signed and Aisha placed line.

## 2021-06-04 VITALS — SYSTOLIC BLOOD PRESSURE: 141 MMHG | DIASTOLIC BLOOD PRESSURE: 56 MMHG

## 2021-06-04 VITALS — SYSTOLIC BLOOD PRESSURE: 140 MMHG | DIASTOLIC BLOOD PRESSURE: 57 MMHG

## 2021-06-04 VITALS — SYSTOLIC BLOOD PRESSURE: 141 MMHG | DIASTOLIC BLOOD PRESSURE: 57 MMHG

## 2021-06-04 VITALS — SYSTOLIC BLOOD PRESSURE: 146 MMHG | DIASTOLIC BLOOD PRESSURE: 62 MMHG

## 2021-06-04 VITALS — SYSTOLIC BLOOD PRESSURE: 132 MMHG | DIASTOLIC BLOOD PRESSURE: 55 MMHG

## 2021-06-04 VITALS — DIASTOLIC BLOOD PRESSURE: 52 MMHG | SYSTOLIC BLOOD PRESSURE: 113 MMHG

## 2021-06-04 VITALS — SYSTOLIC BLOOD PRESSURE: 127 MMHG | DIASTOLIC BLOOD PRESSURE: 53 MMHG

## 2021-06-04 VITALS — DIASTOLIC BLOOD PRESSURE: 50 MMHG | SYSTOLIC BLOOD PRESSURE: 104 MMHG

## 2021-06-04 VITALS — SYSTOLIC BLOOD PRESSURE: 131 MMHG | DIASTOLIC BLOOD PRESSURE: 61 MMHG

## 2021-06-04 VITALS — DIASTOLIC BLOOD PRESSURE: 68 MMHG | SYSTOLIC BLOOD PRESSURE: 122 MMHG

## 2021-06-04 VITALS — SYSTOLIC BLOOD PRESSURE: 110 MMHG | DIASTOLIC BLOOD PRESSURE: 45 MMHG

## 2021-06-04 VITALS — DIASTOLIC BLOOD PRESSURE: 45 MMHG | SYSTOLIC BLOOD PRESSURE: 107 MMHG

## 2021-06-04 VITALS — SYSTOLIC BLOOD PRESSURE: 87 MMHG | DIASTOLIC BLOOD PRESSURE: 42 MMHG

## 2021-06-04 VITALS — SYSTOLIC BLOOD PRESSURE: 126 MMHG | DIASTOLIC BLOOD PRESSURE: 78 MMHG

## 2021-06-04 VITALS — DIASTOLIC BLOOD PRESSURE: 52 MMHG | SYSTOLIC BLOOD PRESSURE: 123 MMHG

## 2021-06-04 VITALS — DIASTOLIC BLOOD PRESSURE: 54 MMHG | SYSTOLIC BLOOD PRESSURE: 127 MMHG

## 2021-06-04 VITALS — SYSTOLIC BLOOD PRESSURE: 128 MMHG | DIASTOLIC BLOOD PRESSURE: 54 MMHG

## 2021-06-04 VITALS — SYSTOLIC BLOOD PRESSURE: 98 MMHG | DIASTOLIC BLOOD PRESSURE: 44 MMHG

## 2021-06-04 VITALS — SYSTOLIC BLOOD PRESSURE: 129 MMHG | DIASTOLIC BLOOD PRESSURE: 53 MMHG

## 2021-06-04 VITALS — SYSTOLIC BLOOD PRESSURE: 117 MMHG | DIASTOLIC BLOOD PRESSURE: 51 MMHG

## 2021-06-04 VITALS — DIASTOLIC BLOOD PRESSURE: 44 MMHG | SYSTOLIC BLOOD PRESSURE: 106 MMHG

## 2021-06-04 VITALS — DIASTOLIC BLOOD PRESSURE: 55 MMHG | SYSTOLIC BLOOD PRESSURE: 137 MMHG

## 2021-06-04 VITALS — SYSTOLIC BLOOD PRESSURE: 183 MMHG | DIASTOLIC BLOOD PRESSURE: 76 MMHG

## 2021-06-04 VITALS — SYSTOLIC BLOOD PRESSURE: 103 MMHG | DIASTOLIC BLOOD PRESSURE: 44 MMHG

## 2021-06-04 VITALS — DIASTOLIC BLOOD PRESSURE: 63 MMHG | SYSTOLIC BLOOD PRESSURE: 153 MMHG

## 2021-06-04 VITALS — DIASTOLIC BLOOD PRESSURE: 51 MMHG | SYSTOLIC BLOOD PRESSURE: 112 MMHG

## 2021-06-04 VITALS — SYSTOLIC BLOOD PRESSURE: 130 MMHG | DIASTOLIC BLOOD PRESSURE: 57 MMHG

## 2021-06-04 VITALS — SYSTOLIC BLOOD PRESSURE: 137 MMHG | DIASTOLIC BLOOD PRESSURE: 69 MMHG

## 2021-06-04 VITALS — DIASTOLIC BLOOD PRESSURE: 54 MMHG | SYSTOLIC BLOOD PRESSURE: 126 MMHG

## 2021-06-04 VITALS — DIASTOLIC BLOOD PRESSURE: 56 MMHG | SYSTOLIC BLOOD PRESSURE: 120 MMHG

## 2021-06-04 VITALS — DIASTOLIC BLOOD PRESSURE: 50 MMHG | SYSTOLIC BLOOD PRESSURE: 119 MMHG

## 2021-06-04 VITALS — DIASTOLIC BLOOD PRESSURE: 57 MMHG | SYSTOLIC BLOOD PRESSURE: 135 MMHG

## 2021-06-04 VITALS — DIASTOLIC BLOOD PRESSURE: 56 MMHG | SYSTOLIC BLOOD PRESSURE: 132 MMHG

## 2021-06-04 VITALS — DIASTOLIC BLOOD PRESSURE: 61 MMHG | SYSTOLIC BLOOD PRESSURE: 147 MMHG

## 2021-06-04 VITALS — DIASTOLIC BLOOD PRESSURE: 69 MMHG | SYSTOLIC BLOOD PRESSURE: 172 MMHG

## 2021-06-04 VITALS — DIASTOLIC BLOOD PRESSURE: 57 MMHG | SYSTOLIC BLOOD PRESSURE: 142 MMHG

## 2021-06-04 VITALS — SYSTOLIC BLOOD PRESSURE: 108 MMHG | DIASTOLIC BLOOD PRESSURE: 49 MMHG

## 2021-06-04 VITALS — DIASTOLIC BLOOD PRESSURE: 52 MMHG | SYSTOLIC BLOOD PRESSURE: 122 MMHG

## 2021-06-04 VITALS — SYSTOLIC BLOOD PRESSURE: 135 MMHG | DIASTOLIC BLOOD PRESSURE: 53 MMHG

## 2021-06-04 VITALS — DIASTOLIC BLOOD PRESSURE: 48 MMHG | SYSTOLIC BLOOD PRESSURE: 111 MMHG

## 2021-06-04 VITALS — DIASTOLIC BLOOD PRESSURE: 65 MMHG | SYSTOLIC BLOOD PRESSURE: 158 MMHG

## 2021-06-04 VITALS — SYSTOLIC BLOOD PRESSURE: 141 MMHG | DIASTOLIC BLOOD PRESSURE: 62 MMHG

## 2021-06-04 VITALS — DIASTOLIC BLOOD PRESSURE: 44 MMHG | SYSTOLIC BLOOD PRESSURE: 101 MMHG

## 2021-06-04 VITALS — SYSTOLIC BLOOD PRESSURE: 126 MMHG | DIASTOLIC BLOOD PRESSURE: 49 MMHG

## 2021-06-04 VITALS — DIASTOLIC BLOOD PRESSURE: 56 MMHG | SYSTOLIC BLOOD PRESSURE: 134 MMHG

## 2021-06-04 VITALS — SYSTOLIC BLOOD PRESSURE: 130 MMHG | DIASTOLIC BLOOD PRESSURE: 50 MMHG

## 2021-06-04 VITALS — SYSTOLIC BLOOD PRESSURE: 134 MMHG | DIASTOLIC BLOOD PRESSURE: 57 MMHG

## 2021-06-04 VITALS — SYSTOLIC BLOOD PRESSURE: 150 MMHG | DIASTOLIC BLOOD PRESSURE: 63 MMHG

## 2021-06-04 VITALS — DIASTOLIC BLOOD PRESSURE: 58 MMHG | SYSTOLIC BLOOD PRESSURE: 130 MMHG

## 2021-06-04 VITALS — SYSTOLIC BLOOD PRESSURE: 118 MMHG | DIASTOLIC BLOOD PRESSURE: 51 MMHG

## 2021-06-04 VITALS — SYSTOLIC BLOOD PRESSURE: 96 MMHG | DIASTOLIC BLOOD PRESSURE: 46 MMHG

## 2021-06-04 VITALS — SYSTOLIC BLOOD PRESSURE: 121 MMHG | DIASTOLIC BLOOD PRESSURE: 67 MMHG

## 2021-06-04 VITALS — SYSTOLIC BLOOD PRESSURE: 90 MMHG | DIASTOLIC BLOOD PRESSURE: 40 MMHG

## 2021-06-04 VITALS — SYSTOLIC BLOOD PRESSURE: 131 MMHG | DIASTOLIC BLOOD PRESSURE: 56 MMHG

## 2021-06-04 VITALS — SYSTOLIC BLOOD PRESSURE: 138 MMHG | DIASTOLIC BLOOD PRESSURE: 54 MMHG

## 2021-06-04 VITALS — DIASTOLIC BLOOD PRESSURE: 68 MMHG | SYSTOLIC BLOOD PRESSURE: 128 MMHG

## 2021-06-04 VITALS — SYSTOLIC BLOOD PRESSURE: 148 MMHG | DIASTOLIC BLOOD PRESSURE: 65 MMHG

## 2021-06-04 VITALS — SYSTOLIC BLOOD PRESSURE: 136 MMHG | DIASTOLIC BLOOD PRESSURE: 53 MMHG

## 2021-06-04 VITALS — SYSTOLIC BLOOD PRESSURE: 116 MMHG | DIASTOLIC BLOOD PRESSURE: 71 MMHG

## 2021-06-04 VITALS — DIASTOLIC BLOOD PRESSURE: 57 MMHG | SYSTOLIC BLOOD PRESSURE: 128 MMHG

## 2021-06-04 VITALS — SYSTOLIC BLOOD PRESSURE: 131 MMHG | DIASTOLIC BLOOD PRESSURE: 63 MMHG

## 2021-06-04 VITALS — DIASTOLIC BLOOD PRESSURE: 57 MMHG | SYSTOLIC BLOOD PRESSURE: 115 MMHG

## 2021-06-04 VITALS — SYSTOLIC BLOOD PRESSURE: 109 MMHG | DIASTOLIC BLOOD PRESSURE: 49 MMHG

## 2021-06-04 VITALS — DIASTOLIC BLOOD PRESSURE: 49 MMHG | SYSTOLIC BLOOD PRESSURE: 112 MMHG

## 2021-06-04 VITALS — DIASTOLIC BLOOD PRESSURE: 50 MMHG | SYSTOLIC BLOOD PRESSURE: 116 MMHG

## 2021-06-04 VITALS — DIASTOLIC BLOOD PRESSURE: 51 MMHG | SYSTOLIC BLOOD PRESSURE: 123 MMHG

## 2021-06-04 VITALS — DIASTOLIC BLOOD PRESSURE: 67 MMHG | SYSTOLIC BLOOD PRESSURE: 118 MMHG

## 2021-06-04 VITALS — DIASTOLIC BLOOD PRESSURE: 54 MMHG | SYSTOLIC BLOOD PRESSURE: 119 MMHG

## 2021-06-04 VITALS — DIASTOLIC BLOOD PRESSURE: 68 MMHG | SYSTOLIC BLOOD PRESSURE: 170 MMHG

## 2021-06-04 VITALS — DIASTOLIC BLOOD PRESSURE: 60 MMHG | SYSTOLIC BLOOD PRESSURE: 112 MMHG

## 2021-06-04 VITALS — DIASTOLIC BLOOD PRESSURE: 49 MMHG | SYSTOLIC BLOOD PRESSURE: 124 MMHG

## 2021-06-04 VITALS — DIASTOLIC BLOOD PRESSURE: 58 MMHG | SYSTOLIC BLOOD PRESSURE: 128 MMHG

## 2021-06-04 VITALS — DIASTOLIC BLOOD PRESSURE: 63 MMHG | SYSTOLIC BLOOD PRESSURE: 143 MMHG

## 2021-06-04 VITALS — DIASTOLIC BLOOD PRESSURE: 66 MMHG | SYSTOLIC BLOOD PRESSURE: 172 MMHG

## 2021-06-04 VITALS — DIASTOLIC BLOOD PRESSURE: 56 MMHG | SYSTOLIC BLOOD PRESSURE: 130 MMHG

## 2021-06-04 VITALS — DIASTOLIC BLOOD PRESSURE: 45 MMHG | SYSTOLIC BLOOD PRESSURE: 102 MMHG

## 2021-06-04 VITALS — SYSTOLIC BLOOD PRESSURE: 111 MMHG | DIASTOLIC BLOOD PRESSURE: 49 MMHG

## 2021-06-04 VITALS — SYSTOLIC BLOOD PRESSURE: 122 MMHG | DIASTOLIC BLOOD PRESSURE: 60 MMHG

## 2021-06-04 VITALS — DIASTOLIC BLOOD PRESSURE: 58 MMHG | SYSTOLIC BLOOD PRESSURE: 134 MMHG

## 2021-06-04 VITALS — SYSTOLIC BLOOD PRESSURE: 97 MMHG | DIASTOLIC BLOOD PRESSURE: 42 MMHG

## 2021-06-04 VITALS — DIASTOLIC BLOOD PRESSURE: 57 MMHG | SYSTOLIC BLOOD PRESSURE: 126 MMHG

## 2021-06-04 VITALS — DIASTOLIC BLOOD PRESSURE: 50 MMHG | SYSTOLIC BLOOD PRESSURE: 122 MMHG

## 2021-06-04 VITALS — SYSTOLIC BLOOD PRESSURE: 146 MMHG | DIASTOLIC BLOOD PRESSURE: 65 MMHG

## 2021-06-04 VITALS — SYSTOLIC BLOOD PRESSURE: 135 MMHG | DIASTOLIC BLOOD PRESSURE: 52 MMHG

## 2021-06-04 LAB
ANION GAP SERPL CALC-SCNC: 10 MMOL/L (ref 7–16)
ANION GAP SERPL CALC-SCNC: 11 MMOL/L (ref 7–16)
ANION GAP SERPL CALC-SCNC: 11 MMOL/L (ref 7–16)
BASOPHILS NFR BLD AUTO: 0.1 % (ref 0–2)
BASOPHILS NFR BLD AUTO: 0.3 % (ref 0–2)
BASOPHILS NFR BLD AUTO: 0.3 % (ref 0–2)
BE(VIVO): -8.1 MMOL/L
BUN SERPL-MCNC: 72 MG/DL (ref 7–18)
BUN SERPL-MCNC: 75 MG/DL (ref 7–18)
BUN SERPL-MCNC: 75 MG/DL (ref 7–18)
CALCIUM SERPL-MCNC: 7.3 MG/DL (ref 8.5–10.1)
CALCIUM SERPL-MCNC: 7.7 MG/DL (ref 8.5–10.1)
CALCIUM SERPL-MCNC: 7.8 MG/DL (ref 8.5–10.1)
CHLORIDE SERPL-SCNC: 116 MMOL/L (ref 98–107)
CO2 SERPL-SCNC: 19 MMOL/L (ref 21–32)
CO2 SERPL-SCNC: 19 MMOL/L (ref 21–32)
CO2 SERPL-SCNC: 20 MMOL/L (ref 21–32)
CREAT SERPL-MCNC: 2.5 MG/DL (ref 0.6–1)
CREAT SERPL-MCNC: 2.5 MG/DL (ref 0.6–1)
CREAT SERPL-MCNC: 2.6 MG/DL (ref 0.6–1)
EOSINOPHIL NFR BLD: 0 % (ref 0–3)
EOSINOPHIL NFR BLD: 0.1 % (ref 0–3)
EOSINOPHIL NFR BLD: 0.1 % (ref 0–3)
ERYTHROCYTE [DISTWIDTH] IN BLOOD BY AUTOMATED COUNT: 16 % (ref 10.5–14.5)
ERYTHROCYTE [DISTWIDTH] IN BLOOD BY AUTOMATED COUNT: 16.5 % (ref 10.5–14.5)
ERYTHROCYTE [DISTWIDTH] IN BLOOD BY AUTOMATED COUNT: 16.8 % (ref 10.5–14.5)
GLUCOSE SERPL-MCNC: 120 MG/DL (ref 74–106)
GLUCOSE SERPL-MCNC: 129 MG/DL (ref 74–106)
GLUCOSE SERPL-MCNC: 146 MG/DL (ref 74–106)
GRANULOCYTES NFR BLD MANUAL: 78 % (ref 36–66)
GRANULOCYTES NFR BLD MANUAL: 78.6 % (ref 36–66)
GRANULOCYTES NFR BLD MANUAL: 81.8 % (ref 36–66)
HCO3 BLD-SCNC: 15.2 MMOL/L (ref 22–26)
HCT VFR BLD CALC: 16.3 % (ref 37–47)
HCT VFR BLD CALC: 17.9 % (ref 37–47)
HCT VFR BLD CALC: 20.1 % (ref 37–47)
HCT VFR BLD CALC: 21.7 % (ref 37–47)
HGB BLD-MCNC: 5.5 GM/DL (ref 12–15)
HGB BLD-MCNC: 6 GM/DL (ref 12–15)
HGB BLD-MCNC: 6.8 GM/DL (ref 12–15)
HGB BLD-MCNC: 7.3 GM/DL (ref 12–15)
LYMPHOCYTES NFR BLD AUTO: 12.2 % (ref 24–44)
LYMPHOCYTES NFR BLD AUTO: 7 % (ref 24–44)
LYMPHOCYTES NFR BLD AUTO: 8.9 % (ref 24–44)
MAGNESIUM SERPL-MCNC: 1.9 MG/DL (ref 1.8–2.4)
MAGNESIUM SERPL-MCNC: 1.9 MG/DL (ref 1.8–2.4)
MAGNESIUM SERPL-MCNC: 2 MG/DL (ref 1.8–2.4)
MCH RBC QN AUTO: 30.8 PG (ref 26–34)
MCH RBC QN AUTO: 30.9 PG (ref 26–34)
MCH RBC QN AUTO: 31.3 PG (ref 26–34)
MCHC RBC AUTO-ENTMCNC: 33.4 G/DL (ref 28–37)
MCHC RBC AUTO-ENTMCNC: 33.7 G/DL (ref 28–37)
MCHC RBC AUTO-ENTMCNC: 33.8 G/DL (ref 28–37)
MCV RBC: 91.4 FL (ref 80–100)
MCV RBC: 92.2 FL (ref 80–100)
MCV RBC: 92.8 FL (ref 80–100)
MONOCYTES NFR BLD: 14 % (ref 1–8)
MONOCYTES NFR BLD: 9 % (ref 1–8)
MONOCYTES NFR BLD: 9.6 % (ref 1–8)
NEUTROPHILS # BLD: 3.9 THOU/UL (ref 1.4–8.2)
NEUTROPHILS # BLD: 4.3 THOU/UL (ref 1.4–8.2)
NEUTROPHILS # BLD: 6.3 THOU/UL (ref 1.4–8.2)
PCO2 BLD: 23.8 MMHG (ref 35–45)
PLATELET # BLD: 111 THOU/UL (ref 150–400)
PLATELET # BLD: 128 THOU/UL (ref 150–400)
PLATELET # BLD: 95 THOU/UL (ref 150–400)
PO2 BLD: 146.2 MMHG (ref 80–100)
POTASSIUM SERPL-SCNC: 3.9 MMOL/L (ref 3.5–5.1)
POTASSIUM SERPL-SCNC: 4.1 MMOL/L (ref 3.5–5.1)
POTASSIUM SERPL-SCNC: 4.3 MMOL/L (ref 3.5–5.1)
RBC # BLD AUTO: 1.77 MIL/UL (ref 4.2–5)
RBC # BLD AUTO: 1.93 MIL/UL (ref 4.2–5)
RBC # BLD AUTO: 2.2 MIL/UL (ref 4.2–5)
SODIUM SERPL-SCNC: 146 MMOL/L (ref 136–145)
TROPONIN I SERPL-MCNC: 0.1 NG/ML (ref ?–0.06)
TROPONIN I SERPL-MCNC: 0.17 NG/ML (ref ?–0.06)
TROPONIN I SERPL-MCNC: 0.19 NG/ML (ref ?–0.06)
WBC # BLD AUTO: 4.8 THOU/UL (ref 4–11)
WBC # BLD AUTO: 5.5 THOU/UL (ref 4–11)
WBC # BLD AUTO: 8 THOU/UL (ref 4–11)

## 2021-06-04 NOTE — NUR
DR DE LA GARZA IN TO EXAMINE PATIENT, UPDATED ON THE HEMAGOBIN, LIABLE BP AND URINE
OUTPUT. ORDERS RECEIVED AND LEVOPHED CHANGED TO VASOPRESSIN. WILL CONTINUE TO
MONITOR.

## 2021-06-04 NOTE — NUR
PATIENT REMAINS ON THE WARMING PORTION OF THE HYPOTHERMIA PROTOCOL.  OVER
BREATHING THE VENT, VSS.  URINE OUTPUT REMAINS LOW.  AWAITING BLOOD TO READY
TO TRANSFUSE.  PATIENT SLOWLY MOVING TOWARDS OUTCOME GOALS BUT REMAINS
CRITICAL.

## 2021-06-04 NOTE — NUR
Patient at goal temp throughout shift. At the beginning of the shift patient
did withdrawl all 4 ext to nailbed pressure. Grimace on face was noted.
Patient was hypertensive so propofol was started. Titrated to effect
throughout the night. Currently at 50 mcgs. Heart rate and rhythm stable in
the 60's. No ectopy or dysrhythmias. Supporting blood pressure, keeping MAP >
65 with just 1 mcg of Levophed. Pressure labile at times. Tolerating vent
settings with adequate oxygenation. Able to decrease Fio2 from 70 to 40% with
O2 sats continuing at 100%. Insulin gtt with q1h accuchecks. Stable in the
120-130's at 0.5-1 unit/hour. Scant U/O. Patient had 2 500 cc NS boluses per
Ajit this shift. U/O remains clear yellow. Irrigated walter without
difficulty and did bladder scan which showed zero in the bladder. Total u/o
overnight was 100 mls. Midnight labs sent. Results noted. Critical hgb at 6.0.
Called to NP. Orders for 1 unit packed cells. Pt T&C but unit required
additional crossmatching which delayed infusion. Now time for 0600 labs witch
will not reflect the unit of PC given. Will make note. Daughter called early
in the shift. Update on patient condition given. Also called the daughter to
obtain permission for blood. Updated on plan of care. See documentation on
interventions for assesment details. Patient is progressing through goals.

## 2021-06-04 NOTE — NUR
Assummed care of this patient from the night nurse, Beth SANTIAGO.  Unit of PRBC's
infusing,  On the hypothermia protocol.  Levophed and Insulin drips continue.

## 2021-06-04 NOTE — 2DMMODE
[facility]
[place]   [address]                   2 D/M-MODE ECHOCARDIOGRAM     
_______________________________________________________________________________
 
Name:       DANYELLE JAMES              Room #:         242-P       ADM IN  
M.R.#:      [id number]                       Account #:      [id number]  
Admission:  06/03/21    Attend Phys:    Yan Bob MD      
Discharge:              Date of Birth:  [birth year]  
                                                          Report #: [id number]
                                                                    [id number]
_______________________________________________________________________________
THIS REPORT FOR:  
 
cc:  Lillig,Mary Ann DO Lillig,Mckay Antunez MD [facility]                                         
                                                                       ~
 
--------------- APPROVED REPORT --------------
 
 
Study performed:  06/04/2021 08:40:02
 
EXAM: Comprehensive 2D, Doppler, and color-flow 
Echocardiogram 
Patient Location: ICU    
Room #:  242     Status:  routine
 
      BSA:         1.73
HR: 67 bpm BP:          148/65 mmHg 
Rhythm: NSR     
 
Other Information 
Study Quality: Good
 
Indications
Diabetes
Dyspnea 
Hypertension/HDD
Cardiac arrest
 
2D Dimensions
RVDd:  34.76 mm   
IVSd:  15.95 (7-11mm)  LVOT Diam:  20.55 (18-24mm) 
LVDd:  33.18 mm   
PWd:  14.25 (7-11mm)  Ascending Ao:  28.59 (22-36mm)
LVDs:  26.62 (25-40mm)  
Left Atrium:  34.17 (27-40mm) 
Aortic Root:  30.31 mm  IVC:  23.00 mm
 
Volumes
Left Atrial Volume (Systole) 
Single Plane 4CH:  81.77 mL Single Plane 2CH:  66.63 mL
    LA ESV Index:  47.00 mL/m2
 
Aortic Valve
AoV Peak Madhu.:  1.04 m/s 
 
 
[facility]
[address]
[place]  [address]
Phone:  [phone] 2 D/M-MODE ECHOCARDIOGRAM     
_______________________________________________________________________________
 
Name:            DANYELLE JAMES              Room #:        242-P       [facility] [place]
M.R.#:           [id number]          Account #:     [id number]  
Admission:       06/03/21         Attend Phys:   Yan Bob MD  
Discharge:                  Date of Birth: [birth year]  
                         Report #:      [id number]
        [id number]
_______________________________________________________________________________
AO Peak Gr.:  4.29 mmHg  LVOT Max PG:  3.24 mmHg
    LVOT Max V:  0.90 m/s
DEN Vmax: 2.88 cm2  
 
Mitral Valve
MV Peak Gr.:  11.06 mmHg 
MV Mean Gr.:  6.46 mmHg  E/A Ratio:  0.8
    MV Decel. Time:  427.48 ms
MV E Max Madhu.:  1.18 m/s 
MV A Madhu.:  1.41 m/s  
MV Max Madhu.:  1.66 m/s  
MV Mean Madhu.:  1.21 m/s  
MV VTI:  434.43 mm  
MV PHT:  123.97 ms  
MVA (PHT):  2.59 cm2  
IVRT:  175.32 ms  
 
Pulmonary Valve
PV Peak Madhu.:  0.87 m/s PV Peak Gr.:  3.03 mmHg
 
Pulmonary Vein
P Vein S:    0.34 m/s P Vein A:  0.20 m/s
P Vein D:   0.21 m/s P Vein A Dur.:  120.0 msec
P Vein S/D Ratio:  1.62 
 
Tricuspid Valve
TR Peak Madhu.:  3.86 m/s  
TR Peak Gr.:  59.58 mmHg 
    PA Pressure:  75.00 mmHg
 
Left Ventricle
The left ventricle is normal size. There is normal LV segmental wall 
motion. Moderate concentric left ventricular hypertrophy. The left 
ventricular systolic function is normal. The left ventricular 
ejection fraction is within the normal range. LVEF is 55-60%. Grade I 
- abnormal relaxation pattern.
 
Right Ventricle
The right ventricle is normal size. The right ventricular systolic 
function is normal.
 
Atria
Left atrium is dilated. Right atrium is dilated.
 
Aortic Valve
The aortic valve is normal in structure. The Aortic valve is 
 
 
[facility]
[address] [place]  [address]
Phone:  [phone]                    2 D/M-MODE ECHOCARDIOGRAM     
_______________________________________________________________________________
 
Name:            ERMELINDA JAMESN L              Room #:        242-P       [facility] [place]
.R.#:           [id number]          Account #:     [id number]  
Admission:       06/03/21         Attend Phys:   Yan Bob MD  
Discharge:                  Date of Birth: [birth year]  
                         Report #:      [id number]
        [id number]
_______________________________________________________________________________
sclerotic. Trace aortic regurgitation. There is no aortic valvular 
stenosis.
 
Mitral Valve
The mitral valve is normal in structure. There is heavy mitral 
annular calcification. Mild to moderate mitral regurgitation. Mild 
mitral stenosis.
 
Tricuspid Valve
The tricuspid valve is normal in structure. There is mild tricuspid 
regurgitation. Estimated PAP 75 mmHg. There is severe pulmonary 
hypertension.
 
Pulmonic Valve
The pulmonary valve is normal in structure. Mild pulmonic 
regurgitation.
 
Great Vessels
The aortic root is normal in size. The inferior vena cava is dilated 
with no inspiratory collapse.
 
Pericardium
There is no pericardial effusion.
 
<Conclusion>
Normal left ventricle size with moderate concentric 
hypertrophy
Ejection fraction 60%, no obvious segmental wall motion 
abnormality
Grade 1 diastolic dysfunction
Normal right ventricle size/function
Color-flow Doppler study was performed of the 
aortic/mitral/tricuspid/pulmonary valve
Moderate biatrial enlargement
Normal aortic valve structure and function
Mild to moderate mitral valve insufficiency
Moderate mitral annular calcification
Mild tricuspid valve insufficiency
Severe pulmonary hypertension PA pressure estimated 75 mmHg
No pericardial effusion
Normal aortic root size.
 
 
 
  <ELECTRONICALLY SIGNED>
   By: Mckay Jackson MD, FACC    
  06/04/21     1025
D: 06/04/21 1025                           _____________________________________
T: 06/04/21 1025                           Mckay Jackson MD, FACC      /INF

## 2021-06-04 NOTE — NUR
Discussed during am rounds with bedside nurse and hospitalist. Hypothermia
protocol, vent, and will start rewarming today. Not anticipating  dc over the
weekend.  Cm provided verbal update to [facility]. Will cont.
following as needed for dc needs.

## 2021-06-05 VITALS — SYSTOLIC BLOOD PRESSURE: 149 MMHG | DIASTOLIC BLOOD PRESSURE: 61 MMHG

## 2021-06-05 VITALS — SYSTOLIC BLOOD PRESSURE: 146 MMHG | DIASTOLIC BLOOD PRESSURE: 61 MMHG

## 2021-06-05 VITALS — DIASTOLIC BLOOD PRESSURE: 61 MMHG | SYSTOLIC BLOOD PRESSURE: 145 MMHG

## 2021-06-05 VITALS — SYSTOLIC BLOOD PRESSURE: 143 MMHG | DIASTOLIC BLOOD PRESSURE: 62 MMHG

## 2021-06-05 VITALS — SYSTOLIC BLOOD PRESSURE: 142 MMHG | DIASTOLIC BLOOD PRESSURE: 58 MMHG

## 2021-06-05 VITALS — DIASTOLIC BLOOD PRESSURE: 63 MMHG | SYSTOLIC BLOOD PRESSURE: 155 MMHG

## 2021-06-05 VITALS — DIASTOLIC BLOOD PRESSURE: 63 MMHG | SYSTOLIC BLOOD PRESSURE: 148 MMHG

## 2021-06-05 VITALS — SYSTOLIC BLOOD PRESSURE: 151 MMHG | DIASTOLIC BLOOD PRESSURE: 60 MMHG

## 2021-06-05 VITALS — SYSTOLIC BLOOD PRESSURE: 141 MMHG | DIASTOLIC BLOOD PRESSURE: 52 MMHG

## 2021-06-05 VITALS — DIASTOLIC BLOOD PRESSURE: 60 MMHG | SYSTOLIC BLOOD PRESSURE: 148 MMHG

## 2021-06-05 VITALS — DIASTOLIC BLOOD PRESSURE: 61 MMHG | SYSTOLIC BLOOD PRESSURE: 154 MMHG

## 2021-06-05 VITALS — DIASTOLIC BLOOD PRESSURE: 66 MMHG | SYSTOLIC BLOOD PRESSURE: 156 MMHG

## 2021-06-05 VITALS — SYSTOLIC BLOOD PRESSURE: 176 MMHG | DIASTOLIC BLOOD PRESSURE: 75 MMHG

## 2021-06-05 VITALS — SYSTOLIC BLOOD PRESSURE: 167 MMHG | DIASTOLIC BLOOD PRESSURE: 68 MMHG

## 2021-06-05 VITALS — DIASTOLIC BLOOD PRESSURE: 66 MMHG | SYSTOLIC BLOOD PRESSURE: 157 MMHG

## 2021-06-05 VITALS — DIASTOLIC BLOOD PRESSURE: 61 MMHG | SYSTOLIC BLOOD PRESSURE: 150 MMHG

## 2021-06-05 VITALS — DIASTOLIC BLOOD PRESSURE: 63 MMHG | SYSTOLIC BLOOD PRESSURE: 152 MMHG

## 2021-06-05 VITALS — SYSTOLIC BLOOD PRESSURE: 156 MMHG | DIASTOLIC BLOOD PRESSURE: 67 MMHG

## 2021-06-05 VITALS — SYSTOLIC BLOOD PRESSURE: 151 MMHG | DIASTOLIC BLOOD PRESSURE: 61 MMHG

## 2021-06-05 VITALS — DIASTOLIC BLOOD PRESSURE: 61 MMHG | SYSTOLIC BLOOD PRESSURE: 147 MMHG

## 2021-06-05 VITALS — DIASTOLIC BLOOD PRESSURE: 60 MMHG | SYSTOLIC BLOOD PRESSURE: 146 MMHG

## 2021-06-05 VITALS — DIASTOLIC BLOOD PRESSURE: 64 MMHG | SYSTOLIC BLOOD PRESSURE: 151 MMHG

## 2021-06-05 VITALS — SYSTOLIC BLOOD PRESSURE: 152 MMHG | DIASTOLIC BLOOD PRESSURE: 62 MMHG

## 2021-06-05 VITALS — SYSTOLIC BLOOD PRESSURE: 146 MMHG | DIASTOLIC BLOOD PRESSURE: 60 MMHG

## 2021-06-05 VITALS — SYSTOLIC BLOOD PRESSURE: 155 MMHG | DIASTOLIC BLOOD PRESSURE: 61 MMHG

## 2021-06-05 VITALS — SYSTOLIC BLOOD PRESSURE: 190 MMHG | DIASTOLIC BLOOD PRESSURE: 86 MMHG

## 2021-06-05 VITALS — SYSTOLIC BLOOD PRESSURE: 150 MMHG | DIASTOLIC BLOOD PRESSURE: 63 MMHG

## 2021-06-05 VITALS — DIASTOLIC BLOOD PRESSURE: 60 MMHG | SYSTOLIC BLOOD PRESSURE: 145 MMHG

## 2021-06-05 VITALS — SYSTOLIC BLOOD PRESSURE: 149 MMHG | DIASTOLIC BLOOD PRESSURE: 60 MMHG

## 2021-06-05 VITALS — SYSTOLIC BLOOD PRESSURE: 143 MMHG | DIASTOLIC BLOOD PRESSURE: 58 MMHG

## 2021-06-05 VITALS — DIASTOLIC BLOOD PRESSURE: 82 MMHG | SYSTOLIC BLOOD PRESSURE: 181 MMHG

## 2021-06-05 VITALS — DIASTOLIC BLOOD PRESSURE: 64 MMHG | SYSTOLIC BLOOD PRESSURE: 157 MMHG

## 2021-06-05 VITALS — DIASTOLIC BLOOD PRESSURE: 62 MMHG | SYSTOLIC BLOOD PRESSURE: 152 MMHG

## 2021-06-05 VITALS — SYSTOLIC BLOOD PRESSURE: 177 MMHG | DIASTOLIC BLOOD PRESSURE: 78 MMHG

## 2021-06-05 VITALS — SYSTOLIC BLOOD PRESSURE: 142 MMHG | DIASTOLIC BLOOD PRESSURE: 56 MMHG

## 2021-06-05 VITALS — SYSTOLIC BLOOD PRESSURE: 145 MMHG | DIASTOLIC BLOOD PRESSURE: 59 MMHG

## 2021-06-05 VITALS — DIASTOLIC BLOOD PRESSURE: 62 MMHG | SYSTOLIC BLOOD PRESSURE: 155 MMHG

## 2021-06-05 VITALS — DIASTOLIC BLOOD PRESSURE: 59 MMHG | SYSTOLIC BLOOD PRESSURE: 153 MMHG

## 2021-06-05 VITALS — DIASTOLIC BLOOD PRESSURE: 61 MMHG | SYSTOLIC BLOOD PRESSURE: 151 MMHG

## 2021-06-05 VITALS — DIASTOLIC BLOOD PRESSURE: 66 MMHG | SYSTOLIC BLOOD PRESSURE: 163 MMHG

## 2021-06-05 VITALS — SYSTOLIC BLOOD PRESSURE: 144 MMHG | DIASTOLIC BLOOD PRESSURE: 62 MMHG

## 2021-06-05 VITALS — DIASTOLIC BLOOD PRESSURE: 65 MMHG | SYSTOLIC BLOOD PRESSURE: 153 MMHG

## 2021-06-05 VITALS — SYSTOLIC BLOOD PRESSURE: 150 MMHG | DIASTOLIC BLOOD PRESSURE: 60 MMHG

## 2021-06-05 LAB
ANION GAP SERPL CALC-SCNC: 15 MMOL/L (ref 7–16)
BASOPHILS NFR BLD AUTO: 0.1 % (ref 0–2)
BE(VIVO): -8.4 MMOL/L
BUN SERPL-MCNC: 69 MG/DL (ref 7–18)
CALCIUM SERPL-MCNC: 7.4 MG/DL (ref 8.5–10.1)
CHLORIDE SERPL-SCNC: 112 MMOL/L (ref 98–107)
CO2 SERPL-SCNC: 18 MMOL/L (ref 21–32)
CREAT SERPL-MCNC: 2.9 MG/DL (ref 0.6–1)
EOSINOPHIL NFR BLD: 0.2 % (ref 0–3)
ERYTHROCYTE [DISTWIDTH] IN BLOOD BY AUTOMATED COUNT: 15.8 % (ref 10.5–14.5)
GLUCOSE SERPL-MCNC: 124 MG/DL (ref 74–106)
GRANULOCYTES NFR BLD MANUAL: 83 % (ref 36–66)
HCO3 BLD-SCNC: 16 MMOL/L (ref 22–26)
HCT VFR BLD CALC: 25.6 % (ref 37–47)
HGB BLD-MCNC: 8.8 GM/DL (ref 12–15)
LYMPHOCYTES NFR BLD AUTO: 8.9 % (ref 24–44)
MAGNESIUM SERPL-MCNC: 1.8 MG/DL (ref 1.8–2.4)
MCH RBC QN AUTO: 31.1 PG (ref 26–34)
MCHC RBC AUTO-ENTMCNC: 34.1 G/DL (ref 28–37)
MCV RBC: 91 FL (ref 80–100)
MONOCYTES NFR BLD: 7.8 % (ref 1–8)
NEUTROPHILS # BLD: 6.9 THOU/UL (ref 1.4–8.2)
PCO2 BLD: 29.2 MMHG (ref 35–45)
PLATELET # BLD: 111 THOU/UL (ref 150–400)
PO2 BLD: 100.8 MMHG (ref 80–100)
POTASSIUM SERPL-SCNC: 4.4 MMOL/L (ref 3.5–5.1)
RBC # BLD AUTO: 2.82 MIL/UL (ref 4.2–5)
SODIUM SERPL-SCNC: 145 MMOL/L (ref 136–145)
WBC # BLD AUTO: 8.3 THOU/UL (ref 4–11)

## 2021-06-05 NOTE — NUR
Patient reached 37 degrees around midnight. At midnight propofol was shut off.
By 0030, SBP was up in the 190's, HR was in the upper 90's, and patient RR was
in the upper 20's. Neuro assessment was completed at this time. + cough,
grimace present, flexion of both upper ext to painful stimuli, no movement of
left leg. Propofol turned back on for comfort and blood pressure control. due
to the elevated pressures, vasopressin off. Blood sugars checked at 2030 was
88, insulin gtt turned off. Subsequent checks remain < 130. HR continues in
the 90's sinus, no ectopy. Adequate oxygenation on current vent settings.
Patient is breathing above set vent rate. Still scant u/o. Am labs drawn.
Results noted. No criticals. Hbg improved. One loose stool. Called [facility] and got a refferal #. Spoke with Pt's daughter, Rosa over
the phone last evening. Gave update in patient condition. Patient's other
daughter called wanting information. Instructed her to call Rosa, as her and
Gerry were the only 2 on the list. See documentation on interventions for
assessment details. Patient is moving through plan of care.

## 2021-06-05 NOTE — NUR
DISCUSSED WITH DTR THE DPOA REGARDING CURRENT CLINICAL PITURE AS WELL AS PLANS
FOR NEURO EVAL ONCE HYPOTHERMIA PROTOCOL IS OVER. NORMOTHERMIA ACHIEVED AROUND
MIDNIGHT LAST EVENING AND PER PROTOCOL NORMOTHERMIA TO BE MAINTAINED WITH
MACHINE USAGE FOR NEXT 48 HOURS. PT'S DAUGHTER STATED UNDERSTANDING AND
GRATITUDE FOR THE EXPLANATION OF EVENT AND FUTURE PLANNING, STATED SHE WILL BE
BACK IN TO SEE THE PT IN FEW HOURS. RN STATED THAT SHE MAY CALL AND WE WILL
CALL IF THERE ARE UPDATES NEEDED

## 2021-06-06 VITALS — DIASTOLIC BLOOD PRESSURE: 58 MMHG | SYSTOLIC BLOOD PRESSURE: 143 MMHG

## 2021-06-06 VITALS — DIASTOLIC BLOOD PRESSURE: 81 MMHG | SYSTOLIC BLOOD PRESSURE: 182 MMHG

## 2021-06-06 VITALS — DIASTOLIC BLOOD PRESSURE: 61 MMHG | SYSTOLIC BLOOD PRESSURE: 161 MMHG

## 2021-06-06 VITALS — SYSTOLIC BLOOD PRESSURE: 175 MMHG | DIASTOLIC BLOOD PRESSURE: 71 MMHG

## 2021-06-06 VITALS — DIASTOLIC BLOOD PRESSURE: 70 MMHG | SYSTOLIC BLOOD PRESSURE: 176 MMHG

## 2021-06-06 VITALS — SYSTOLIC BLOOD PRESSURE: 172 MMHG | DIASTOLIC BLOOD PRESSURE: 69 MMHG

## 2021-06-06 VITALS — SYSTOLIC BLOOD PRESSURE: 180 MMHG | DIASTOLIC BLOOD PRESSURE: 72 MMHG

## 2021-06-06 VITALS — SYSTOLIC BLOOD PRESSURE: 174 MMHG | DIASTOLIC BLOOD PRESSURE: 64 MMHG

## 2021-06-06 VITALS — SYSTOLIC BLOOD PRESSURE: 169 MMHG | DIASTOLIC BLOOD PRESSURE: 72 MMHG

## 2021-06-06 VITALS — SYSTOLIC BLOOD PRESSURE: 170 MMHG | DIASTOLIC BLOOD PRESSURE: 67 MMHG

## 2021-06-06 VITALS — SYSTOLIC BLOOD PRESSURE: 171 MMHG | DIASTOLIC BLOOD PRESSURE: 70 MMHG

## 2021-06-06 VITALS — SYSTOLIC BLOOD PRESSURE: 165 MMHG | DIASTOLIC BLOOD PRESSURE: 69 MMHG

## 2021-06-06 VITALS — DIASTOLIC BLOOD PRESSURE: 71 MMHG | SYSTOLIC BLOOD PRESSURE: 178 MMHG

## 2021-06-06 VITALS — SYSTOLIC BLOOD PRESSURE: 175 MMHG | DIASTOLIC BLOOD PRESSURE: 70 MMHG

## 2021-06-06 VITALS — SYSTOLIC BLOOD PRESSURE: 156 MMHG | DIASTOLIC BLOOD PRESSURE: 62 MMHG

## 2021-06-06 VITALS — DIASTOLIC BLOOD PRESSURE: 69 MMHG | SYSTOLIC BLOOD PRESSURE: 171 MMHG

## 2021-06-06 VITALS — SYSTOLIC BLOOD PRESSURE: 176 MMHG | DIASTOLIC BLOOD PRESSURE: 67 MMHG

## 2021-06-06 VITALS — DIASTOLIC BLOOD PRESSURE: 59 MMHG | SYSTOLIC BLOOD PRESSURE: 148 MMHG

## 2021-06-06 VITALS — DIASTOLIC BLOOD PRESSURE: 67 MMHG | SYSTOLIC BLOOD PRESSURE: 167 MMHG

## 2021-06-06 VITALS — SYSTOLIC BLOOD PRESSURE: 178 MMHG | DIASTOLIC BLOOD PRESSURE: 70 MMHG

## 2021-06-06 VITALS — SYSTOLIC BLOOD PRESSURE: 179 MMHG | DIASTOLIC BLOOD PRESSURE: 76 MMHG

## 2021-06-06 VITALS — DIASTOLIC BLOOD PRESSURE: 63 MMHG | SYSTOLIC BLOOD PRESSURE: 161 MMHG

## 2021-06-06 VITALS — DIASTOLIC BLOOD PRESSURE: 85 MMHG | SYSTOLIC BLOOD PRESSURE: 199 MMHG

## 2021-06-06 VITALS — SYSTOLIC BLOOD PRESSURE: 165 MMHG | DIASTOLIC BLOOD PRESSURE: 64 MMHG

## 2021-06-06 VITALS — SYSTOLIC BLOOD PRESSURE: 165 MMHG | DIASTOLIC BLOOD PRESSURE: 63 MMHG

## 2021-06-06 VITALS — SYSTOLIC BLOOD PRESSURE: 171 MMHG | DIASTOLIC BLOOD PRESSURE: 65 MMHG

## 2021-06-06 VITALS — DIASTOLIC BLOOD PRESSURE: 77 MMHG | SYSTOLIC BLOOD PRESSURE: 179 MMHG

## 2021-06-06 VITALS — SYSTOLIC BLOOD PRESSURE: 184 MMHG | DIASTOLIC BLOOD PRESSURE: 71 MMHG

## 2021-06-06 VITALS — DIASTOLIC BLOOD PRESSURE: 68 MMHG | SYSTOLIC BLOOD PRESSURE: 170 MMHG

## 2021-06-06 VITALS — DIASTOLIC BLOOD PRESSURE: 70 MMHG | SYSTOLIC BLOOD PRESSURE: 175 MMHG

## 2021-06-06 VITALS — SYSTOLIC BLOOD PRESSURE: 200 MMHG | DIASTOLIC BLOOD PRESSURE: 81 MMHG

## 2021-06-06 VITALS — DIASTOLIC BLOOD PRESSURE: 81 MMHG | SYSTOLIC BLOOD PRESSURE: 192 MMHG

## 2021-06-06 VITALS — SYSTOLIC BLOOD PRESSURE: 152 MMHG | DIASTOLIC BLOOD PRESSURE: 60 MMHG

## 2021-06-06 VITALS — SYSTOLIC BLOOD PRESSURE: 174 MMHG | DIASTOLIC BLOOD PRESSURE: 71 MMHG

## 2021-06-06 VITALS — DIASTOLIC BLOOD PRESSURE: 62 MMHG | SYSTOLIC BLOOD PRESSURE: 159 MMHG

## 2021-06-06 VITALS — DIASTOLIC BLOOD PRESSURE: 67 MMHG | SYSTOLIC BLOOD PRESSURE: 169 MMHG

## 2021-06-06 VITALS — DIASTOLIC BLOOD PRESSURE: 64 MMHG | SYSTOLIC BLOOD PRESSURE: 165 MMHG

## 2021-06-06 VITALS — DIASTOLIC BLOOD PRESSURE: 69 MMHG | SYSTOLIC BLOOD PRESSURE: 181 MMHG

## 2021-06-06 VITALS — DIASTOLIC BLOOD PRESSURE: 77 MMHG | SYSTOLIC BLOOD PRESSURE: 186 MMHG

## 2021-06-06 VITALS — DIASTOLIC BLOOD PRESSURE: 72 MMHG | SYSTOLIC BLOOD PRESSURE: 176 MMHG

## 2021-06-06 VITALS — SYSTOLIC BLOOD PRESSURE: 167 MMHG | DIASTOLIC BLOOD PRESSURE: 66 MMHG

## 2021-06-06 VITALS — SYSTOLIC BLOOD PRESSURE: 156 MMHG | DIASTOLIC BLOOD PRESSURE: 59 MMHG

## 2021-06-06 LAB
ALBUMIN SERPL-MCNC: 2.1 G/DL (ref 3.4–5)
ALT SERPL-CCNC: 213 U/L (ref 30–65)
ANION GAP SERPL CALC-SCNC: 16 MMOL/L (ref 7–16)
AST SERPL-CCNC: 75 U/L (ref 15–37)
BILIRUB SERPL-MCNC: 0.6 MG/DL (ref 0.2–1)
BUN SERPL-MCNC: 65 MG/DL (ref 7–18)
CALCIUM SERPL-MCNC: 6.9 MG/DL (ref 8.5–10.1)
CHLORIDE SERPL-SCNC: 108 MMOL/L (ref 98–107)
CO2 SERPL-SCNC: 18 MMOL/L (ref 21–32)
CREAT SERPL-MCNC: 3.4 MG/DL (ref 0.6–1)
ERYTHROCYTE [DISTWIDTH] IN BLOOD BY AUTOMATED COUNT: 15.9 % (ref 10.5–14.5)
GLUCOSE SERPL-MCNC: 192 MG/DL (ref 74–106)
HCT VFR BLD CALC: 25.6 % (ref 37–47)
HGB BLD-MCNC: 8.8 GM/DL (ref 12–15)
MCH RBC QN AUTO: 31.1 PG (ref 26–34)
MCHC RBC AUTO-ENTMCNC: 34.3 G/DL (ref 28–37)
MCV RBC: 90.7 FL (ref 80–100)
PLATELET # BLD: 125 THOU/UL (ref 150–400)
POTASSIUM SERPL-SCNC: 4.2 MMOL/L (ref 3.5–5.1)
PROT SERPL-MCNC: 5.5 G/DL (ref 6.4–8.2)
RBC # BLD AUTO: 2.82 MIL/UL (ref 4.2–5)
SODIUM SERPL-SCNC: 142 MMOL/L (ref 136–145)
WBC # BLD AUTO: 10 THOU/UL (ref 4–11)

## 2021-06-06 NOTE — NUR
PT SEEN BY , RN EXPLAINED SEDATION TURNED OFF AROUND MIDNIGHT LAST
NIGHT, WITH NO PROPOFOL GTT, WAS TOLD PLAN IS TO DO ANOTHER EEG THEN EVALUATE.
WILL UPDATE DPOA ONCE SHE ARRIVES. STILL ON NORMOTEMPATURE SETTING ON THE
[facility]

## 2021-06-07 VITALS — DIASTOLIC BLOOD PRESSURE: 81 MMHG | SYSTOLIC BLOOD PRESSURE: 191 MMHG

## 2021-06-07 VITALS — DIASTOLIC BLOOD PRESSURE: 72 MMHG | SYSTOLIC BLOOD PRESSURE: 178 MMHG

## 2021-06-07 VITALS — SYSTOLIC BLOOD PRESSURE: 177 MMHG | DIASTOLIC BLOOD PRESSURE: 73 MMHG

## 2021-06-07 VITALS — DIASTOLIC BLOOD PRESSURE: 47 MMHG | SYSTOLIC BLOOD PRESSURE: 127 MMHG

## 2021-06-07 VITALS — SYSTOLIC BLOOD PRESSURE: 169 MMHG | DIASTOLIC BLOOD PRESSURE: 71 MMHG

## 2021-06-07 VITALS — SYSTOLIC BLOOD PRESSURE: 139 MMHG | DIASTOLIC BLOOD PRESSURE: 50 MMHG

## 2021-06-07 VITALS — SYSTOLIC BLOOD PRESSURE: 157 MMHG | DIASTOLIC BLOOD PRESSURE: 58 MMHG

## 2021-06-07 VITALS — DIASTOLIC BLOOD PRESSURE: 68 MMHG | SYSTOLIC BLOOD PRESSURE: 152 MMHG

## 2021-06-07 VITALS — SYSTOLIC BLOOD PRESSURE: 164 MMHG | DIASTOLIC BLOOD PRESSURE: 64 MMHG

## 2021-06-07 VITALS — SYSTOLIC BLOOD PRESSURE: 139 MMHG | DIASTOLIC BLOOD PRESSURE: 53 MMHG

## 2021-06-07 VITALS — SYSTOLIC BLOOD PRESSURE: 174 MMHG | DIASTOLIC BLOOD PRESSURE: 75 MMHG

## 2021-06-07 VITALS — DIASTOLIC BLOOD PRESSURE: 63 MMHG | SYSTOLIC BLOOD PRESSURE: 149 MMHG

## 2021-06-07 VITALS — DIASTOLIC BLOOD PRESSURE: 61 MMHG | SYSTOLIC BLOOD PRESSURE: 158 MMHG

## 2021-06-07 VITALS — DIASTOLIC BLOOD PRESSURE: 67 MMHG | SYSTOLIC BLOOD PRESSURE: 169 MMHG

## 2021-06-07 VITALS — SYSTOLIC BLOOD PRESSURE: 148 MMHG | DIASTOLIC BLOOD PRESSURE: 60 MMHG

## 2021-06-07 VITALS — SYSTOLIC BLOOD PRESSURE: 168 MMHG | DIASTOLIC BLOOD PRESSURE: 75 MMHG

## 2021-06-07 VITALS — DIASTOLIC BLOOD PRESSURE: 46 MMHG | SYSTOLIC BLOOD PRESSURE: 125 MMHG

## 2021-06-07 VITALS — DIASTOLIC BLOOD PRESSURE: 64 MMHG | SYSTOLIC BLOOD PRESSURE: 158 MMHG

## 2021-06-07 VITALS — DIASTOLIC BLOOD PRESSURE: 52 MMHG | SYSTOLIC BLOOD PRESSURE: 145 MMHG

## 2021-06-07 VITALS — DIASTOLIC BLOOD PRESSURE: 63 MMHG | SYSTOLIC BLOOD PRESSURE: 152 MMHG

## 2021-06-07 VITALS — SYSTOLIC BLOOD PRESSURE: 136 MMHG | DIASTOLIC BLOOD PRESSURE: 57 MMHG

## 2021-06-07 VITALS — SYSTOLIC BLOOD PRESSURE: 178 MMHG | DIASTOLIC BLOOD PRESSURE: 74 MMHG

## 2021-06-07 VITALS — SYSTOLIC BLOOD PRESSURE: 174 MMHG | DIASTOLIC BLOOD PRESSURE: 76 MMHG

## 2021-06-07 VITALS — SYSTOLIC BLOOD PRESSURE: 153 MMHG | DIASTOLIC BLOOD PRESSURE: 64 MMHG

## 2021-06-07 VITALS — SYSTOLIC BLOOD PRESSURE: 172 MMHG | DIASTOLIC BLOOD PRESSURE: 73 MMHG

## 2021-06-07 VITALS — SYSTOLIC BLOOD PRESSURE: 167 MMHG | DIASTOLIC BLOOD PRESSURE: 72 MMHG

## 2021-06-07 VITALS — DIASTOLIC BLOOD PRESSURE: 70 MMHG | SYSTOLIC BLOOD PRESSURE: 172 MMHG

## 2021-06-07 VITALS — DIASTOLIC BLOOD PRESSURE: 55 MMHG | SYSTOLIC BLOOD PRESSURE: 144 MMHG

## 2021-06-07 VITALS — DIASTOLIC BLOOD PRESSURE: 75 MMHG | SYSTOLIC BLOOD PRESSURE: 175 MMHG

## 2021-06-07 VITALS — DIASTOLIC BLOOD PRESSURE: 54 MMHG | SYSTOLIC BLOOD PRESSURE: 143 MMHG

## 2021-06-07 VITALS — SYSTOLIC BLOOD PRESSURE: 171 MMHG | DIASTOLIC BLOOD PRESSURE: 68 MMHG

## 2021-06-07 VITALS — SYSTOLIC BLOOD PRESSURE: 187 MMHG | DIASTOLIC BLOOD PRESSURE: 76 MMHG

## 2021-06-07 VITALS — DIASTOLIC BLOOD PRESSURE: 71 MMHG | SYSTOLIC BLOOD PRESSURE: 172 MMHG

## 2021-06-07 VITALS — DIASTOLIC BLOOD PRESSURE: 74 MMHG | SYSTOLIC BLOOD PRESSURE: 173 MMHG

## 2021-06-07 VITALS — DIASTOLIC BLOOD PRESSURE: 66 MMHG | SYSTOLIC BLOOD PRESSURE: 160 MMHG

## 2021-06-07 VITALS — DIASTOLIC BLOOD PRESSURE: 77 MMHG | SYSTOLIC BLOOD PRESSURE: 170 MMHG

## 2021-06-07 VITALS — DIASTOLIC BLOOD PRESSURE: 73 MMHG | SYSTOLIC BLOOD PRESSURE: 179 MMHG

## 2021-06-07 VITALS — DIASTOLIC BLOOD PRESSURE: 60 MMHG | SYSTOLIC BLOOD PRESSURE: 161 MMHG

## 2021-06-07 VITALS — DIASTOLIC BLOOD PRESSURE: 70 MMHG | SYSTOLIC BLOOD PRESSURE: 173 MMHG

## 2021-06-07 VITALS — SYSTOLIC BLOOD PRESSURE: 124 MMHG | DIASTOLIC BLOOD PRESSURE: 48 MMHG

## 2021-06-07 VITALS — SYSTOLIC BLOOD PRESSURE: 176 MMHG | DIASTOLIC BLOOD PRESSURE: 70 MMHG

## 2021-06-07 VITALS — SYSTOLIC BLOOD PRESSURE: 167 MMHG | DIASTOLIC BLOOD PRESSURE: 70 MMHG

## 2021-06-07 VITALS — SYSTOLIC BLOOD PRESSURE: 163 MMHG | DIASTOLIC BLOOD PRESSURE: 68 MMHG

## 2021-06-07 VITALS — DIASTOLIC BLOOD PRESSURE: 68 MMHG | SYSTOLIC BLOOD PRESSURE: 171 MMHG

## 2021-06-07 VITALS — SYSTOLIC BLOOD PRESSURE: 142 MMHG | DIASTOLIC BLOOD PRESSURE: 54 MMHG

## 2021-06-07 VITALS — SYSTOLIC BLOOD PRESSURE: 168 MMHG | DIASTOLIC BLOOD PRESSURE: 70 MMHG

## 2021-06-07 VITALS — DIASTOLIC BLOOD PRESSURE: 73 MMHG | SYSTOLIC BLOOD PRESSURE: 178 MMHG

## 2021-06-07 VITALS — SYSTOLIC BLOOD PRESSURE: 147 MMHG | DIASTOLIC BLOOD PRESSURE: 57 MMHG

## 2021-06-07 VITALS — SYSTOLIC BLOOD PRESSURE: 191 MMHG | DIASTOLIC BLOOD PRESSURE: 89 MMHG

## 2021-06-07 LAB
ALBUMIN SERPL-MCNC: 2.1 G/DL (ref 3.4–5)
ALT SERPL-CCNC: 147 U/L (ref 30–65)
ANION GAP SERPL CALC-SCNC: 16 MMOL/L (ref 7–16)
AST SERPL-CCNC: 42 U/L (ref 15–37)
BE(VIVO): -4.1 MMOL/L
BILIRUB SERPL-MCNC: 1 MG/DL (ref 0.2–1)
BUN SERPL-MCNC: 67 MG/DL (ref 7–18)
CALCIUM SERPL-MCNC: 6.5 MG/DL (ref 8.5–10.1)
CHLORIDE SERPL-SCNC: 107 MMOL/L (ref 98–107)
CO2 SERPL-SCNC: 19 MMOL/L (ref 21–32)
CREAT SERPL-MCNC: 3.9 MG/DL (ref 0.6–1)
ERYTHROCYTE [DISTWIDTH] IN BLOOD BY AUTOMATED COUNT: 15.8 % (ref 10.5–14.5)
GLUCOSE SERPL-MCNC: 171 MG/DL (ref 74–106)
HCO3 BLD-SCNC: 19.4 MMOL/L (ref 22–26)
HCT VFR BLD CALC: 25.8 % (ref 37–47)
HGB BLD-MCNC: 8.7 GM/DL (ref 12–15)
MAGNESIUM SERPL-MCNC: 1.6 MG/DL (ref 1.8–2.4)
MCH RBC QN AUTO: 30.4 PG (ref 26–34)
MCHC RBC AUTO-ENTMCNC: 33.8 G/DL (ref 28–37)
MCV RBC: 89.8 FL (ref 80–100)
PCO2 BLD: 29.9 MMHG (ref 35–45)
PLATELET # BLD: 124 THOU/UL (ref 150–400)
PO2 BLD: 78.4 MMHG (ref 80–100)
POTASSIUM SERPL-SCNC: 3.9 MMOL/L (ref 3.5–5.1)
PROT SERPL-MCNC: 5.6 G/DL (ref 6.4–8.2)
RBC # BLD AUTO: 2.88 MIL/UL (ref 4.2–5)
SODIUM SERPL-SCNC: 142 MMOL/L (ref 136–145)
WBC # BLD AUTO: 11.4 THOU/UL (ref 4–11)

## 2021-06-07 NOTE — NUR
Chart review, Faxed updates to [facility] care center [facility].  discussed
during los and am rounds. Cont. to require vent support. EEG ordered for
today. CM visited Daughter Keira outside of room. Active listening and
support provided during conversation.   will cont. following as needed for dc
needs.

## 2021-06-07 NOTE — NUR
Patient family, Rosa, was in room today. She was updated throughout the day
of patient progress and nutrition starting, as well as plan of care. She was
able to talk with Dr. Bob today. Tube feeding started. Patient slowly
progressing towards plan of care as it was noted patient did not have a cough
reflex and she has a gag and a cough reflex today and shows that she does not
like oral care as evidenced by clamping lips closed tightly with each time.
Plan of care is to continue to monitor patient status and continue to monitor
for signs of improvement in neuro status. Dr. Jaimes aware of urine output and
changed fluids today.

## 2021-06-08 VITALS — DIASTOLIC BLOOD PRESSURE: 54 MMHG | SYSTOLIC BLOOD PRESSURE: 140 MMHG

## 2021-06-08 VITALS — DIASTOLIC BLOOD PRESSURE: 58 MMHG | SYSTOLIC BLOOD PRESSURE: 140 MMHG

## 2021-06-08 VITALS — SYSTOLIC BLOOD PRESSURE: 164 MMHG | DIASTOLIC BLOOD PRESSURE: 61 MMHG

## 2021-06-08 VITALS — DIASTOLIC BLOOD PRESSURE: 53 MMHG | SYSTOLIC BLOOD PRESSURE: 138 MMHG

## 2021-06-08 VITALS — SYSTOLIC BLOOD PRESSURE: 90 MMHG | DIASTOLIC BLOOD PRESSURE: 34 MMHG

## 2021-06-08 VITALS — SYSTOLIC BLOOD PRESSURE: 129 MMHG | DIASTOLIC BLOOD PRESSURE: 46 MMHG

## 2021-06-08 VITALS — SYSTOLIC BLOOD PRESSURE: 96 MMHG | DIASTOLIC BLOOD PRESSURE: 37 MMHG

## 2021-06-08 VITALS — SYSTOLIC BLOOD PRESSURE: 154 MMHG | DIASTOLIC BLOOD PRESSURE: 73 MMHG

## 2021-06-08 VITALS — SYSTOLIC BLOOD PRESSURE: 104 MMHG | DIASTOLIC BLOOD PRESSURE: 43 MMHG

## 2021-06-08 VITALS — SYSTOLIC BLOOD PRESSURE: 145 MMHG | DIASTOLIC BLOOD PRESSURE: 59 MMHG

## 2021-06-08 VITALS — DIASTOLIC BLOOD PRESSURE: 70 MMHG | SYSTOLIC BLOOD PRESSURE: 162 MMHG

## 2021-06-08 VITALS — DIASTOLIC BLOOD PRESSURE: 60 MMHG | SYSTOLIC BLOOD PRESSURE: 154 MMHG

## 2021-06-08 VITALS — DIASTOLIC BLOOD PRESSURE: 61 MMHG | SYSTOLIC BLOOD PRESSURE: 147 MMHG

## 2021-06-08 VITALS — DIASTOLIC BLOOD PRESSURE: 41 MMHG | SYSTOLIC BLOOD PRESSURE: 102 MMHG

## 2021-06-08 VITALS — DIASTOLIC BLOOD PRESSURE: 64 MMHG | SYSTOLIC BLOOD PRESSURE: 152 MMHG

## 2021-06-08 VITALS — DIASTOLIC BLOOD PRESSURE: 64 MMHG | SYSTOLIC BLOOD PRESSURE: 162 MMHG

## 2021-06-08 VITALS — DIASTOLIC BLOOD PRESSURE: 63 MMHG | SYSTOLIC BLOOD PRESSURE: 138 MMHG

## 2021-06-08 VITALS — DIASTOLIC BLOOD PRESSURE: 59 MMHG | SYSTOLIC BLOOD PRESSURE: 146 MMHG

## 2021-06-08 VITALS — DIASTOLIC BLOOD PRESSURE: 46 MMHG | SYSTOLIC BLOOD PRESSURE: 120 MMHG

## 2021-06-08 VITALS — DIASTOLIC BLOOD PRESSURE: 37 MMHG | SYSTOLIC BLOOD PRESSURE: 93 MMHG

## 2021-06-08 VITALS — SYSTOLIC BLOOD PRESSURE: 111 MMHG | DIASTOLIC BLOOD PRESSURE: 44 MMHG

## 2021-06-08 VITALS — SYSTOLIC BLOOD PRESSURE: 161 MMHG | DIASTOLIC BLOOD PRESSURE: 65 MMHG

## 2021-06-08 VITALS — SYSTOLIC BLOOD PRESSURE: 162 MMHG | DIASTOLIC BLOOD PRESSURE: 65 MMHG

## 2021-06-08 VITALS — SYSTOLIC BLOOD PRESSURE: 161 MMHG | DIASTOLIC BLOOD PRESSURE: 61 MMHG

## 2021-06-08 VITALS — SYSTOLIC BLOOD PRESSURE: 136 MMHG | DIASTOLIC BLOOD PRESSURE: 52 MMHG

## 2021-06-08 VITALS — DIASTOLIC BLOOD PRESSURE: 53 MMHG | SYSTOLIC BLOOD PRESSURE: 136 MMHG

## 2021-06-08 VITALS — SYSTOLIC BLOOD PRESSURE: 127 MMHG | DIASTOLIC BLOOD PRESSURE: 46 MMHG

## 2021-06-08 VITALS — SYSTOLIC BLOOD PRESSURE: 127 MMHG | DIASTOLIC BLOOD PRESSURE: 48 MMHG

## 2021-06-08 VITALS — SYSTOLIC BLOOD PRESSURE: 138 MMHG | DIASTOLIC BLOOD PRESSURE: 55 MMHG

## 2021-06-08 VITALS — SYSTOLIC BLOOD PRESSURE: 132 MMHG | DIASTOLIC BLOOD PRESSURE: 54 MMHG

## 2021-06-08 VITALS — SYSTOLIC BLOOD PRESSURE: 97 MMHG | DIASTOLIC BLOOD PRESSURE: 39 MMHG

## 2021-06-08 VITALS — DIASTOLIC BLOOD PRESSURE: 52 MMHG | SYSTOLIC BLOOD PRESSURE: 136 MMHG

## 2021-06-08 VITALS — SYSTOLIC BLOOD PRESSURE: 146 MMHG | DIASTOLIC BLOOD PRESSURE: 58 MMHG

## 2021-06-08 VITALS — SYSTOLIC BLOOD PRESSURE: 104 MMHG | DIASTOLIC BLOOD PRESSURE: 40 MMHG

## 2021-06-08 VITALS — SYSTOLIC BLOOD PRESSURE: 134 MMHG | DIASTOLIC BLOOD PRESSURE: 54 MMHG

## 2021-06-08 VITALS — DIASTOLIC BLOOD PRESSURE: 65 MMHG | SYSTOLIC BLOOD PRESSURE: 142 MMHG

## 2021-06-08 VITALS — SYSTOLIC BLOOD PRESSURE: 159 MMHG | DIASTOLIC BLOOD PRESSURE: 83 MMHG

## 2021-06-08 VITALS — DIASTOLIC BLOOD PRESSURE: 33 MMHG | SYSTOLIC BLOOD PRESSURE: 89 MMHG

## 2021-06-08 VITALS — DIASTOLIC BLOOD PRESSURE: 49 MMHG | SYSTOLIC BLOOD PRESSURE: 136 MMHG

## 2021-06-08 VITALS — DIASTOLIC BLOOD PRESSURE: 39 MMHG | SYSTOLIC BLOOD PRESSURE: 98 MMHG

## 2021-06-08 VITALS — DIASTOLIC BLOOD PRESSURE: 54 MMHG | SYSTOLIC BLOOD PRESSURE: 138 MMHG

## 2021-06-08 VITALS — DIASTOLIC BLOOD PRESSURE: 61 MMHG | SYSTOLIC BLOOD PRESSURE: 141 MMHG

## 2021-06-08 VITALS — SYSTOLIC BLOOD PRESSURE: 141 MMHG | DIASTOLIC BLOOD PRESSURE: 58 MMHG

## 2021-06-08 VITALS — SYSTOLIC BLOOD PRESSURE: 99 MMHG | DIASTOLIC BLOOD PRESSURE: 39 MMHG

## 2021-06-08 VITALS — DIASTOLIC BLOOD PRESSURE: 51 MMHG | SYSTOLIC BLOOD PRESSURE: 123 MMHG

## 2021-06-08 VITALS — SYSTOLIC BLOOD PRESSURE: 135 MMHG | DIASTOLIC BLOOD PRESSURE: 51 MMHG

## 2021-06-08 VITALS — DIASTOLIC BLOOD PRESSURE: 36 MMHG | SYSTOLIC BLOOD PRESSURE: 96 MMHG

## 2021-06-08 LAB
ANION GAP SERPL CALC-SCNC: 15 MMOL/L (ref 7–16)
APTT BLD: 35.6 SECONDS (ref 24.5–32.8)
BE(VIVO): -7.3 MMOL/L
BUN SERPL-MCNC: 67 MG/DL (ref 7–18)
CALCIUM SERPL-MCNC: 6.2 MG/DL (ref 8.5–10.1)
CHLORIDE SERPL-SCNC: 104 MMOL/L (ref 98–107)
CO2 SERPL-SCNC: 20 MMOL/L (ref 21–32)
CREAT SERPL-MCNC: 4.1 MG/DL (ref 0.6–1)
ERYTHROCYTE [DISTWIDTH] IN BLOOD BY AUTOMATED COUNT: 15.8 % (ref 10.5–14.5)
FIBRINOGEN PPP-MCNC: 477 MG/DL (ref 201–437)
GLUCOSE SERPL-MCNC: 242 MG/DL (ref 74–106)
HCO3 BLD-SCNC: 18 MMOL/L (ref 22–26)
HCT VFR BLD CALC: 24.6 % (ref 37–47)
HCT VFR BLD CALC: 25 % (ref 37–47)
HGB BLD-MCNC: 8.2 GM/DL (ref 12–15)
HGB BLD-MCNC: 8.5 GM/DL (ref 12–15)
INR PPP: 0.98
MCH RBC QN AUTO: 30 PG (ref 26–34)
MCHC RBC AUTO-ENTMCNC: 33.1 G/DL (ref 28–37)
MCV RBC: 90.6 FL (ref 80–100)
PCO2 BLD: 35.4 MMHG (ref 35–45)
PLATELET # BLD: 123 THOU/UL (ref 150–400)
PO2 BLD: 124.2 MMHG (ref 80–100)
POTASSIUM SERPL-SCNC: 3.6 MMOL/L (ref 3.5–5.1)
PROTHROMBIN TIME: 10.7 SECONDS (ref 10.5–12.1)
RBC # BLD AUTO: 2.72 MIL/UL (ref 4.2–5)
SODIUM SERPL-SCNC: 139 MMOL/L (ref 136–145)
WBC # BLD AUTO: 7.7 THOU/UL (ref 4–11)

## 2021-06-08 NOTE — NUR
Keira ([facility]) called for an update on pt@ 2235. Pt was at bedside when pt was
bleeding from her nose/ENT at bedside, had to be put on a bear warmer d/t core
body temps in 34 C, and seizure like activity when she was being turned for
being cleaned up. Pt was being put on at the time on propofol to help with
seizure control and comfort. DPOA was undated that she is normothermic, not
bleeding with the rhino rockets in place and no evidence of any seizure like
activity since then. She was given an update that neurology's note of poor
prognosis and comfort care was reccomended if no changes in a few days.
Daughter was upset and emotionally on the phone. She stated that she wanted a
second opinion from a Neurologist she knows that is willing to look at the
test results and images already performed. Daughter is advised to have the
physician request medical records. RN infoms daughter if she is wanting the
second opinion to do it soon since the patient is very critical. RN also
informs the [facility] that we will be happy to assist her in getting the second
opinion and willing to answer any questions she or any of her family may have.
 
request
medical records

## 2021-06-08 NOTE — NUR
ASSUMED PT CARE AROUND 2245. PT REMAINS NONRESPONSIVE TO NOXIOUS STIMULI.
TOLERATING VENT WELL. ORAL CARE PROVIDED; SOME BLOODY ORAL SECRETIONS NOTED.
TF VIA OGT. INCREASING TF RATE SLOWLY TOWARD GOAL OF 55ML/HR. PRN HYDRALAZINE
GIVEN FOR SBP >160. SBP NOW 130S-140S.  NP ON CALL FOR HOSPITALIST NOTIFIED OF
LOW URINE OUTPUT.  ONETIME DOSE IV LASIX GIVEN PER ORDER. REPOSITIONED TO
PREVENT SKIN BREAKDOWN.  NOT PROGRESSING TOWARD POC GOALS. WILL CONTINUE TO
MONITOR FURTHER.

## 2021-06-08 NOTE — NUR
AT APPX 1330, PT DEVELOPED EPISTAXIS FROM BOTH NARES W/ FRANKLY BLOODY
DRAINAGE SUCTIONED FROM MOUTH AND BLOOD TINGED SECRETIONS FROM ETT. BILAT
NARES PACKED W/ 4X4 GAUZE. ORAL CARE PROVIDED. DR. HERMOSILLO NOTIFIED AND ORDERS
NOTED.

## 2021-06-08 NOTE — NUR
AT 1400, DR. HERMOSILLO HERE TO PLACE 5.5 CM RAPID RHINO PACKING TO BILAT NARES.
INFLATED W/ 8CC AIR TO EACH PACKING. PT STILL HAVING BLOOD TINGED ORAL
SECRETIONS. CALLED DAUGHTER, RUSTAM CAVANAUGH, TO PROVIDE W/ UPDATE. RUSTAM
STATED THEY WERE ON THEIR WAY BACK. REASSURANCES AND EMOTIONAL SUPPORT
PROVIDED.

## 2021-06-08 NOTE — NUR
SPOKE W/ PT'S DAUGHTER, RUSTAM, REGARDING CODE STATUS WISHES. RUSTAM STATED,
"IF SHE GOES INTO CARDIAC ARREST WHILE ON THE VENTILATOR, WE DO NOT WANT HER
RESUSCITATED. IF SHE IS IMPROVING AND GETS OFF THE VENTILATOR, WE DO WANT
EVERYTHING DONE, BECAUSE THAT MEANS THAT SHE HAD BEEN IMPROVING. WE WOULD WANT
TO GIVE HER A CHANCE AT THAT POINT." RUSTAM IS IN AGREEMENT TO A DNR AT THIS
TIME. SPOKE W/ DR. MIR, WHO IS ALSO IN AGREEMENT AND VERBALIZED THE ORDER
FOR DNR VIA TELEPHONE.

## 2021-06-09 VITALS — SYSTOLIC BLOOD PRESSURE: 161 MMHG | DIASTOLIC BLOOD PRESSURE: 71 MMHG

## 2021-06-09 VITALS — SYSTOLIC BLOOD PRESSURE: 128 MMHG | DIASTOLIC BLOOD PRESSURE: 50 MMHG

## 2021-06-09 VITALS — SYSTOLIC BLOOD PRESSURE: 133 MMHG | DIASTOLIC BLOOD PRESSURE: 52 MMHG

## 2021-06-09 VITALS — DIASTOLIC BLOOD PRESSURE: 61 MMHG | SYSTOLIC BLOOD PRESSURE: 148 MMHG

## 2021-06-09 VITALS — DIASTOLIC BLOOD PRESSURE: 51 MMHG | SYSTOLIC BLOOD PRESSURE: 128 MMHG

## 2021-06-09 VITALS — SYSTOLIC BLOOD PRESSURE: 118 MMHG | DIASTOLIC BLOOD PRESSURE: 47 MMHG

## 2021-06-09 VITALS — DIASTOLIC BLOOD PRESSURE: 49 MMHG | SYSTOLIC BLOOD PRESSURE: 131 MMHG

## 2021-06-09 VITALS — SYSTOLIC BLOOD PRESSURE: 134 MMHG | DIASTOLIC BLOOD PRESSURE: 53 MMHG

## 2021-06-09 VITALS — DIASTOLIC BLOOD PRESSURE: 48 MMHG | SYSTOLIC BLOOD PRESSURE: 123 MMHG

## 2021-06-09 VITALS — DIASTOLIC BLOOD PRESSURE: 56 MMHG | SYSTOLIC BLOOD PRESSURE: 139 MMHG

## 2021-06-09 VITALS — SYSTOLIC BLOOD PRESSURE: 168 MMHG | DIASTOLIC BLOOD PRESSURE: 61 MMHG

## 2021-06-09 VITALS — DIASTOLIC BLOOD PRESSURE: 56 MMHG | SYSTOLIC BLOOD PRESSURE: 146 MMHG

## 2021-06-09 VITALS — SYSTOLIC BLOOD PRESSURE: 114 MMHG | DIASTOLIC BLOOD PRESSURE: 46 MMHG

## 2021-06-09 VITALS — DIASTOLIC BLOOD PRESSURE: 56 MMHG | SYSTOLIC BLOOD PRESSURE: 131 MMHG

## 2021-06-09 VITALS — SYSTOLIC BLOOD PRESSURE: 139 MMHG | DIASTOLIC BLOOD PRESSURE: 54 MMHG

## 2021-06-09 VITALS — DIASTOLIC BLOOD PRESSURE: 61 MMHG | SYSTOLIC BLOOD PRESSURE: 143 MMHG

## 2021-06-09 VITALS — DIASTOLIC BLOOD PRESSURE: 65 MMHG | SYSTOLIC BLOOD PRESSURE: 125 MMHG

## 2021-06-09 VITALS — DIASTOLIC BLOOD PRESSURE: 66 MMHG | SYSTOLIC BLOOD PRESSURE: 167 MMHG

## 2021-06-09 VITALS — SYSTOLIC BLOOD PRESSURE: 86 MMHG | DIASTOLIC BLOOD PRESSURE: 38 MMHG

## 2021-06-09 VITALS — SYSTOLIC BLOOD PRESSURE: 145 MMHG | DIASTOLIC BLOOD PRESSURE: 57 MMHG

## 2021-06-09 VITALS — DIASTOLIC BLOOD PRESSURE: 67 MMHG | SYSTOLIC BLOOD PRESSURE: 155 MMHG

## 2021-06-09 VITALS — SYSTOLIC BLOOD PRESSURE: 152 MMHG | DIASTOLIC BLOOD PRESSURE: 64 MMHG

## 2021-06-09 VITALS — DIASTOLIC BLOOD PRESSURE: 58 MMHG | SYSTOLIC BLOOD PRESSURE: 140 MMHG

## 2021-06-09 VITALS — DIASTOLIC BLOOD PRESSURE: 55 MMHG | SYSTOLIC BLOOD PRESSURE: 126 MMHG

## 2021-06-09 VITALS — SYSTOLIC BLOOD PRESSURE: 146 MMHG | DIASTOLIC BLOOD PRESSURE: 55 MMHG

## 2021-06-09 VITALS — DIASTOLIC BLOOD PRESSURE: 70 MMHG | SYSTOLIC BLOOD PRESSURE: 165 MMHG

## 2021-06-09 VITALS — SYSTOLIC BLOOD PRESSURE: 129 MMHG | DIASTOLIC BLOOD PRESSURE: 52 MMHG

## 2021-06-09 VITALS — SYSTOLIC BLOOD PRESSURE: 148 MMHG | DIASTOLIC BLOOD PRESSURE: 58 MMHG

## 2021-06-09 VITALS — DIASTOLIC BLOOD PRESSURE: 50 MMHG | SYSTOLIC BLOOD PRESSURE: 150 MMHG

## 2021-06-09 VITALS — DIASTOLIC BLOOD PRESSURE: 42 MMHG | SYSTOLIC BLOOD PRESSURE: 131 MMHG

## 2021-06-09 VITALS — DIASTOLIC BLOOD PRESSURE: 52 MMHG | SYSTOLIC BLOOD PRESSURE: 128 MMHG

## 2021-06-09 VITALS — SYSTOLIC BLOOD PRESSURE: 144 MMHG | DIASTOLIC BLOOD PRESSURE: 59 MMHG

## 2021-06-09 VITALS — SYSTOLIC BLOOD PRESSURE: 124 MMHG | DIASTOLIC BLOOD PRESSURE: 48 MMHG

## 2021-06-09 VITALS — DIASTOLIC BLOOD PRESSURE: 43 MMHG | SYSTOLIC BLOOD PRESSURE: 103 MMHG

## 2021-06-09 VITALS — DIASTOLIC BLOOD PRESSURE: 47 MMHG | SYSTOLIC BLOOD PRESSURE: 126 MMHG

## 2021-06-09 VITALS — DIASTOLIC BLOOD PRESSURE: 59 MMHG | SYSTOLIC BLOOD PRESSURE: 165 MMHG

## 2021-06-09 VITALS — SYSTOLIC BLOOD PRESSURE: 174 MMHG | DIASTOLIC BLOOD PRESSURE: 70 MMHG

## 2021-06-09 VITALS — DIASTOLIC BLOOD PRESSURE: 61 MMHG | SYSTOLIC BLOOD PRESSURE: 145 MMHG

## 2021-06-09 VITALS — DIASTOLIC BLOOD PRESSURE: 49 MMHG | SYSTOLIC BLOOD PRESSURE: 133 MMHG

## 2021-06-09 VITALS — DIASTOLIC BLOOD PRESSURE: 51 MMHG | SYSTOLIC BLOOD PRESSURE: 138 MMHG

## 2021-06-09 VITALS — DIASTOLIC BLOOD PRESSURE: 59 MMHG | SYSTOLIC BLOOD PRESSURE: 152 MMHG

## 2021-06-09 VITALS — DIASTOLIC BLOOD PRESSURE: 67 MMHG | SYSTOLIC BLOOD PRESSURE: 157 MMHG

## 2021-06-09 VITALS — SYSTOLIC BLOOD PRESSURE: 129 MMHG | DIASTOLIC BLOOD PRESSURE: 60 MMHG

## 2021-06-09 VITALS — DIASTOLIC BLOOD PRESSURE: 60 MMHG | SYSTOLIC BLOOD PRESSURE: 162 MMHG

## 2021-06-09 VITALS — SYSTOLIC BLOOD PRESSURE: 195 MMHG | DIASTOLIC BLOOD PRESSURE: 71 MMHG

## 2021-06-09 VITALS — SYSTOLIC BLOOD PRESSURE: 168 MMHG | DIASTOLIC BLOOD PRESSURE: 77 MMHG

## 2021-06-09 VITALS — SYSTOLIC BLOOD PRESSURE: 126 MMHG | DIASTOLIC BLOOD PRESSURE: 50 MMHG

## 2021-06-09 LAB
ANION GAP SERPL CALC-SCNC: 14 MMOL/L (ref 7–16)
BE(VIVO): -7.4 MMOL/L
BUN SERPL-MCNC: 69 MG/DL (ref 7–18)
CALCIUM SERPL-MCNC: 6.1 MG/DL (ref 8.5–10.1)
CHLORIDE SERPL-SCNC: 102 MMOL/L (ref 98–107)
CO2 SERPL-SCNC: 20 MMOL/L (ref 21–32)
CREAT SERPL-MCNC: 4.4 MG/DL (ref 0.6–1)
ERYTHROCYTE [DISTWIDTH] IN BLOOD BY AUTOMATED COUNT: 15.7 % (ref 10.5–14.5)
GLUCOSE SERPL-MCNC: 230 MG/DL (ref 74–106)
HCO3 BLD-SCNC: 17.3 MMOL/L (ref 22–26)
HCT VFR BLD CALC: 23.7 % (ref 37–47)
HGB BLD-MCNC: 8 GM/DL (ref 12–15)
MCH RBC QN AUTO: 30.6 PG (ref 26–34)
MCHC RBC AUTO-ENTMCNC: 33.7 G/DL (ref 28–37)
MCV RBC: 90.5 FL (ref 80–100)
PCO2 BLD: 31.5 MMHG (ref 35–45)
PLATELET # BLD: 128 THOU/UL (ref 150–400)
PO2 BLD: 128.9 MMHG (ref 80–100)
POTASSIUM SERPL-SCNC: 3.3 MMOL/L (ref 3.5–5.1)
RBC # BLD AUTO: 2.62 MIL/UL (ref 4.2–5)
SODIUM SERPL-SCNC: 136 MMOL/L (ref 136–145)
WBC # BLD AUTO: 8.9 THOU/UL (ref 4–11)

## 2021-06-09 NOTE — NUR
NO EVENTS WITH PATIENT THIS SHIFT. ENT STATED RHINO ROCKETS ARE TO BE KEPT IN
FOR 3-5 DAYS.  LASIX INCREASED. 10 MEQ OF KCL ADMINISTERED. ANASARCA
WORSENING. FAMILY UPDATED.

## 2021-06-09 NOTE — HC
[facility]
[address]
[place]   [address]                     CONSULTATION                  
_______________________________________________________________________________
 
Name:       DANYELLE JAMES              Room #:         242-P       ADM [place]  
M.R.#:      [id number]                       Account #:      [id number]  
Admission:  06/03/21    Attend Phys:    Yan Bob MD      
Discharge:              Date of Birth:  [birth year]  
                                                          Report #: [id number]
                                                                    [id number] 
_______________________________________________________________________________
THIS REPORT FOR:  
 
cc:  Lillig,Mary Ann DO Lillig,Danisha Echols,Cristo ESCOBAR MD                                            ~
 
DOC #: [id number]
 
cc:     Mary Ann Lillig, DO
Mark S. Walton, MD
DATE OF SERVICE: 06/08/2021
 
EMERGENCY CONSULTATION
 
SURGEON:  Cristo Lovell MD.
 
REASON FOR CONSULTATION:  Uncontrollable epistaxis.
 
HISTORY OF PRESENT ILLNESS:  The patient is an 81-year-old female admitted to 
[facility] on 06/03 after being found down at home from a 
myocardial infarction.  She was being cared for by her nursing staff in the ICU 
today when she began having uncontrollable bleeding from her nose with 
hemoptysis.  I was called and as I was [place], directed the nurses
on how to place a Rapid Rhino epistaxis balloon bilaterally, which did control 
the bleeding.  The patient's protime was 8.7 with an INR of 0.98, but elevated 
PTT of 35.6, elevated fibrinogen of 477, hemoglobin of 8.5 and hematocrit of 25,
134,000 platelets.  The patient is here with her daughter.  Apparently, she has 
had no history of abnormal bleeding.
 
Significant for out-of-hospital cardiac arrest, anoxic encephalopathy, 
cardiogenic shock, unresponsive for 20 minutes or greater prior to 
resuscitation.  Neurology has evaluated.  There is significant encephalopathy on
EEG and prognosis remains guarded.
 
PAST MEDICAL HISTORY:  Also significant for chronic diastolic heart failure, 
anemia, hypertension, diabetes mellitus, hypothyroidism, PVD, CKD and CHRIS.
 
MEDICATIONS:  Reviewed in her electronic MAR.
 
ALLERGIES:  Also reviewed.
 
REVIEW OF SYSTEMS:  Could not be done.
 
PHYSICAL EXAMINATION:
GENERAL:  Shows an intubated, unresponsive female in the intensive care unit 
with her daughter at her side.
 
 
 
[address]   [address]                     CONSULTATION                  
_______________________________________________________________________________
 
Name:       DANYELLE JAMES              Room #:         242-P       [facility] [place]  
M.R.#:      [id number]                       Account #:      [id number]  
Admission:  06/03/21    Attend Phys:    Yan Bob MD      
Discharge:              Date of Birth:  [birth year]  
                                                          Report #: [id number]
                                                                    [id number] 
_______________________________________________________________________________
 
VITAL SIGNS:  Show temperature of 97.5, pulse of 77, respirations 19, blood 
pressure 136/53.
HEENT:  She has in place bilateral Rapid Rhino anterior, posterior epistaxis 
balloons with no active bleeding.  She is orally intubated.
NECK:  Trachea is in the midline.  I cannot appreciate any significant 
adenopathy.
 
ASSESSMENT:
Uncontrollable epistaxis, controlled now with bilateral Rapid Rhino epistaxis 
balloon.  Once these are in place, they will need to stay 3-5 days.  If the 
patient will need any staphylococcal coverage, I would recommend Unasyn 3.1 gram
q.i.d.
2.  Out of hospital arrest, resulting in cardiogenic shock and anoxic 
encephalopathy with significant encephalopathy on EEG.  Prognosis remains 
guarded, but the patient remains full code.
3.  Bradycardia.
4.  Elevated troponin.
5.  Chronic diastolic heart failure.
6.  Chronic kidney disease with acute kidney injury.
 
PLAN:  We will plan to follow in the periphery and be available for removal of 
packing or any further bleeding as necessary.  The patient is now controlled and
safe.
 
I appreciate the consultation.
 
MD KATIE Burns/DIANNE
 
D: 06/08/2021 03:16 PM
T: 06/08/2021 11:21 PM
 
 
 
 
 
 
 
 
 
 
 
 
  <ELECTRONICALLY SIGNED>
   By: Cristo Lovell MD            
  06/09/21     1407
D: 06/08/21 1416                           _____________________________________
T: 06/08/21 2221                           Cristo Lovell MD              /nt

## 2021-06-10 VITALS — SYSTOLIC BLOOD PRESSURE: 154 MMHG | DIASTOLIC BLOOD PRESSURE: 61 MMHG

## 2021-06-10 VITALS — SYSTOLIC BLOOD PRESSURE: 108 MMHG | DIASTOLIC BLOOD PRESSURE: 44 MMHG

## 2021-06-10 VITALS — SYSTOLIC BLOOD PRESSURE: 165 MMHG | DIASTOLIC BLOOD PRESSURE: 67 MMHG

## 2021-06-10 VITALS — DIASTOLIC BLOOD PRESSURE: 73 MMHG | SYSTOLIC BLOOD PRESSURE: 170 MMHG

## 2021-06-10 VITALS — DIASTOLIC BLOOD PRESSURE: 60 MMHG | SYSTOLIC BLOOD PRESSURE: 144 MMHG

## 2021-06-10 VITALS — DIASTOLIC BLOOD PRESSURE: 52 MMHG | SYSTOLIC BLOOD PRESSURE: 124 MMHG

## 2021-06-10 VITALS — DIASTOLIC BLOOD PRESSURE: 66 MMHG | SYSTOLIC BLOOD PRESSURE: 168 MMHG

## 2021-06-10 VITALS — SYSTOLIC BLOOD PRESSURE: 141 MMHG | DIASTOLIC BLOOD PRESSURE: 57 MMHG

## 2021-06-10 VITALS — SYSTOLIC BLOOD PRESSURE: 114 MMHG | DIASTOLIC BLOOD PRESSURE: 39 MMHG

## 2021-06-10 VITALS — DIASTOLIC BLOOD PRESSURE: 35 MMHG | SYSTOLIC BLOOD PRESSURE: 110 MMHG

## 2021-06-10 VITALS — SYSTOLIC BLOOD PRESSURE: 136 MMHG | DIASTOLIC BLOOD PRESSURE: 57 MMHG

## 2021-06-10 VITALS — DIASTOLIC BLOOD PRESSURE: 54 MMHG | SYSTOLIC BLOOD PRESSURE: 137 MMHG

## 2021-06-10 VITALS — DIASTOLIC BLOOD PRESSURE: 64 MMHG | SYSTOLIC BLOOD PRESSURE: 152 MMHG

## 2021-06-10 VITALS — DIASTOLIC BLOOD PRESSURE: 35 MMHG | SYSTOLIC BLOOD PRESSURE: 102 MMHG

## 2021-06-10 VITALS — DIASTOLIC BLOOD PRESSURE: 49 MMHG | SYSTOLIC BLOOD PRESSURE: 124 MMHG

## 2021-06-10 VITALS — DIASTOLIC BLOOD PRESSURE: 34 MMHG | SYSTOLIC BLOOD PRESSURE: 98 MMHG

## 2021-06-10 VITALS — DIASTOLIC BLOOD PRESSURE: 46 MMHG | SYSTOLIC BLOOD PRESSURE: 110 MMHG

## 2021-06-10 VITALS — SYSTOLIC BLOOD PRESSURE: 120 MMHG | DIASTOLIC BLOOD PRESSURE: 37 MMHG

## 2021-06-10 VITALS — DIASTOLIC BLOOD PRESSURE: 39 MMHG | SYSTOLIC BLOOD PRESSURE: 102 MMHG

## 2021-06-10 VITALS — SYSTOLIC BLOOD PRESSURE: 97 MMHG | DIASTOLIC BLOOD PRESSURE: 35 MMHG

## 2021-06-10 VITALS — SYSTOLIC BLOOD PRESSURE: 113 MMHG | DIASTOLIC BLOOD PRESSURE: 47 MMHG

## 2021-06-10 VITALS — SYSTOLIC BLOOD PRESSURE: 99 MMHG | DIASTOLIC BLOOD PRESSURE: 36 MMHG

## 2021-06-10 VITALS — SYSTOLIC BLOOD PRESSURE: 126 MMHG | DIASTOLIC BLOOD PRESSURE: 51 MMHG

## 2021-06-10 VITALS — SYSTOLIC BLOOD PRESSURE: 93 MMHG | DIASTOLIC BLOOD PRESSURE: 32 MMHG

## 2021-06-10 VITALS — DIASTOLIC BLOOD PRESSURE: 51 MMHG | SYSTOLIC BLOOD PRESSURE: 123 MMHG

## 2021-06-10 VITALS — DIASTOLIC BLOOD PRESSURE: 31 MMHG | SYSTOLIC BLOOD PRESSURE: 93 MMHG

## 2021-06-10 VITALS — DIASTOLIC BLOOD PRESSURE: 50 MMHG | SYSTOLIC BLOOD PRESSURE: 121 MMHG

## 2021-06-10 VITALS — DIASTOLIC BLOOD PRESSURE: 51 MMHG | SYSTOLIC BLOOD PRESSURE: 128 MMHG

## 2021-06-10 VITALS — SYSTOLIC BLOOD PRESSURE: 164 MMHG | DIASTOLIC BLOOD PRESSURE: 72 MMHG

## 2021-06-10 VITALS — SYSTOLIC BLOOD PRESSURE: 131 MMHG | DIASTOLIC BLOOD PRESSURE: 50 MMHG

## 2021-06-10 VITALS — SYSTOLIC BLOOD PRESSURE: 113 MMHG | DIASTOLIC BLOOD PRESSURE: 48 MMHG

## 2021-06-10 VITALS — SYSTOLIC BLOOD PRESSURE: 210 MMHG | DIASTOLIC BLOOD PRESSURE: 88 MMHG

## 2021-06-10 VITALS — DIASTOLIC BLOOD PRESSURE: 44 MMHG | SYSTOLIC BLOOD PRESSURE: 116 MMHG

## 2021-06-10 VITALS — SYSTOLIC BLOOD PRESSURE: 110 MMHG | DIASTOLIC BLOOD PRESSURE: 45 MMHG

## 2021-06-10 VITALS — DIASTOLIC BLOOD PRESSURE: 51 MMHG | SYSTOLIC BLOOD PRESSURE: 136 MMHG

## 2021-06-10 VITALS — SYSTOLIC BLOOD PRESSURE: 120 MMHG | DIASTOLIC BLOOD PRESSURE: 46 MMHG

## 2021-06-10 VITALS — DIASTOLIC BLOOD PRESSURE: 31 MMHG | SYSTOLIC BLOOD PRESSURE: 94 MMHG

## 2021-06-10 VITALS — SYSTOLIC BLOOD PRESSURE: 127 MMHG | DIASTOLIC BLOOD PRESSURE: 48 MMHG

## 2021-06-10 VITALS — SYSTOLIC BLOOD PRESSURE: 87 MMHG | DIASTOLIC BLOOD PRESSURE: 27 MMHG

## 2021-06-10 VITALS — SYSTOLIC BLOOD PRESSURE: 119 MMHG | DIASTOLIC BLOOD PRESSURE: 42 MMHG

## 2021-06-10 VITALS — SYSTOLIC BLOOD PRESSURE: 112 MMHG | DIASTOLIC BLOOD PRESSURE: 49 MMHG

## 2021-06-10 VITALS — SYSTOLIC BLOOD PRESSURE: 121 MMHG | DIASTOLIC BLOOD PRESSURE: 49 MMHG

## 2021-06-10 VITALS — SYSTOLIC BLOOD PRESSURE: 95 MMHG | DIASTOLIC BLOOD PRESSURE: 33 MMHG

## 2021-06-10 VITALS — SYSTOLIC BLOOD PRESSURE: 118 MMHG | DIASTOLIC BLOOD PRESSURE: 44 MMHG

## 2021-06-10 VITALS — SYSTOLIC BLOOD PRESSURE: 122 MMHG | DIASTOLIC BLOOD PRESSURE: 53 MMHG

## 2021-06-10 VITALS — SYSTOLIC BLOOD PRESSURE: 124 MMHG | DIASTOLIC BLOOD PRESSURE: 51 MMHG

## 2021-06-10 VITALS — SYSTOLIC BLOOD PRESSURE: 177 MMHG | DIASTOLIC BLOOD PRESSURE: 64 MMHG

## 2021-06-10 LAB
ALBUMIN SERPL-MCNC: 1.4 G/DL (ref 3.4–5)
ANION GAP SERPL CALC-SCNC: 13 MMOL/L (ref 7–16)
BUN SERPL-MCNC: 75 MG/DL (ref 7–18)
CALCIUM SERPL-MCNC: 6.1 MG/DL (ref 8.5–10.1)
CHLORIDE SERPL-SCNC: 102 MMOL/L (ref 98–107)
CO2 SERPL-SCNC: 20 MMOL/L (ref 21–32)
CREAT SERPL-MCNC: 4.4 MG/DL (ref 0.6–1)
GLUCOSE SERPL-MCNC: 189 MG/DL (ref 74–106)
PHOSPHATE SERPL-MCNC: 4.3 MG/DL (ref 2.5–4.9)
POTASSIUM SERPL-SCNC: 3.5 MMOL/L (ref 3.5–5.1)
SODIUM SERPL-SCNC: 135 MMOL/L (ref 136–145)

## 2021-06-10 NOTE — NUR
PT OPENS EYES TO NOXIOUS STIMULI, DOES NOT FOLLOW COMMANDS, DOES NOT MOVE
EXTREMITIES. DECORTICATE POSTURING OCCASIONALLY W/ NOXIOUS STIMULI. LASIX DRIP
STARTED TODAY. MORPHINE DRIP STARTED TODAY. URINE OUTPUT NOT SIGNIFICANTLY
AFFECTED BY LASIX DRIP. PT'S FAMILY VISITING OFTEN. REASSURANCES AND EMOTIONAL
SUPPORT PROVIDED AS NEEDED. PT IN GUARDED CONDITION AND NOT PROGRESSING TOWARD
GOALS.

## 2021-06-10 NOTE — EEG
[facility]
[address]
[place]  [address]                      ELECTROENCEPHALOGRAM          
_______________________________________________________________________________
 
Name:       DANYELLE JAMES               Room #:         242-P       ADM [place]  
M.R.#:      [id number]      Account #:      [id number]  
Admission:  06/03/21     Attend Phys:    Yan Bob MD     
Discharge:               Date of Birth:  [birth year]  
                                                           Report #: [id number]
    [id number]
_______________________________________________________________________________
THIS REPORT FOR:   //name//                          
 
DOC #: [id number]
Michael Bullock MD
DATE OF SERVICE: 06/07/2021
 
This patient is being evaluated for hypoxic encephalopathy.  This EEG was done 
without any sedation.
 
EEG was done by placing the electrode by standard 10-20 system of electrode 
placement.  Both referential and sequential montages were used for recording.  
This is a very low voltage activity.  That makes it very difficult to sense this
patient's EEG.  Background activity appeared to be about 4-5 Hz and 5 microvolt.
 Photic stimulation is unremarkable.
 
IMPRESSION:  This is a severely abnormal EEG because it shows only low voltage 
activity.  There is a nonspecific finding which can occur with encephalopathy, 
effect of psychotropic medication, dementia, etc.  Clinical correlation is 
recommended.
 
Michael Bullock MD
PK/VINNIE
 
D: 06/07/2021 01:58 PM
T: 06/07/2021 02:26 PM
 
 
 
 
 
 
 
 
 
 
 
 
 
 
 
 
 
 
 
       <ELECTRONICALLY SIGNED>
   By: Michael Bullock MD         
  06/10/21     1417
D: 06/07/21 1258                           _____________________________________
T: 06/07/21 1326                           Michael Bullock MD           /nt

## 2021-06-10 NOTE — NUR
REMAINS INTUBATED AND NONRESPONSIVE. PROPOFOL OFF FOR 14 HOURS. HAD A LARGE
LOOSE STOOL. NOR PROGRESSING TOWArd goals

## 2021-06-10 NOTE — EEG
[facility]
[address]
[place]  [address]                      ELECTROENCEPHALOGRAM          
_______________________________________________________________________________
 
Name:       DANYELLE JAMES               Room #:         242-P       ADM IN  
M.R.#:      [id number]      Account #:      [id number]  
Admission:  06/03/21     Attend Phys:    Yan Bob MD     
Discharge:               Date of Birth:  [birth year]  
                                                           Report #: [id number]
    [id number]
_______________________________________________________________________________
THIS REPORT FOR:   //name//                          
 
DOC #: [id number]
Michael Bullock MD
DATE OF SERVICE: 06/05/2021
 
The patient is being evaluated for hypoxic encephalopathy.  EEG was done on 
propofol.  EEG demonstrate only low voltage activity, which is difficult to tell
because it is low voltage, this appeared to be about 5-6 Hz and 5 microvolt.  
Photic stimulation is unremarkable.
 
IMPRESSION:  This is an abnormal EEG because it shows only low voltage 
activities.  That can be consistent with encephalopathy, but the patient was on 
propofol when the EEG was done.  It will be desirable to get an EEG done when 
the patient is off propofol.
 
Thank you very much for this referral.
 
Michael Bullock MD
PK/RAMANA/JOVANNY
 
D: 06/06/2021 08:10 AM
T: 06/06/2021 08:25 AM
 
 
 
 
 
 
 
 
 
 
 
 
 
 
 
 
 
 
 
 
 
       <ELECTRONICALLY SIGNED>
   By: Michael Bullock MD         
  06/10/21     1417
D: 06/06/21 0710                           _____________________________________
T: 06/06/21 0725                           Michael Bullock MD           /nt

## 2021-06-10 NOTE — HC
[facility]
[address]
[place]   [address]                     CONSULTATION                  
_______________________________________________________________________________
 
Name:       DANYELLE JAMES              Room #:         242-P       ADM IN  
M.R.#:      [id number]                       Account #:      [id number]  
Admission:  06/03/21    Attend Phys:    Yan Bob MD      
Discharge:              Date of Birth:  [birth year]  
                                                          Report #: [id number]
                                                                    [id number] 
_______________________________________________________________________________
THIS REPORT FOR:  
 
cc:  Lillig,Mary Ann DO Lillig,Danisha Bhatti,Michael ROPER MD                                         ~
 
DOC #: [id number]
Michael Bullock MD
DATE OF SERVICE: 06/04/2021
 
HISTORY OF PRESENT ILLNESS:  This is an 81-year-old female patient who is unable
to provide any history.  The patient is intubated and on vent.  Neurology 
consultation was requested to evaluate the patient for hypoxic encephalopathy.  
History is entirely from the record.  The patient apparently lives in a nursing 
home and was found unresponsive.  She did have a faint pulse when she came in.
 
REVIEW OF SYSTEMS:  From the record indicated sometime along the line, she had 
left-sided weakness, chronic renal failure, acute kidney injury and anemia, 
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 
arrest, colon mass, congestive heart failure, delirium, diabetes, dizziness, 
hematuria, hyperkalemia, paroxysmal atrial tachycardia, pyelonephritis, leg 
swelling.  This is all from record.
 
PAST MEDICAL HISTORY:  Positive for numerous problems as summarized above.
 
FAMILY HISTORY:  Unavailable in this patient.
 
SOCIAL HISTORY:  Unavailable in this patient.
 
NEUROLOGIC:  Pretty limited.  She is completely unresponsive, but she is on 
propofol.  Reflexes cannot be elicited and she does have asymmetrical pupils 
which I suspect is baseline.  The best I can tell.  She did not have any imaging
study of the brain.  Presently, she is on hypothermia protocol.
 
PHYSICAL EXAMINATION:
VITAL SIGNS:  Temperature is 34.8.  Blood pressure is 106/44.
 
LABORATORY DATA:  Indicates a hemoglobin of 5.5 and platelet count of only 95.  
She had a prior amputation.  It looks like that is all the examination, which 
can be carried out in this patient.
 
IMPRESSION:  Difficult to tell in this patient, but this patient has a poor 
quality of the life.  Desirable thing is to do very conservative care in this 
patient, but notes indicate that they want to be aggressive.  Because of that, 
we probably will be doing a CT and EEG, but we will let her stabilize first and 
come off the hypothermia protocol.
 
 
 
[facility]
[address] [place]   [address]                     CONSULTATION                  
_______________________________________________________________________________
 
Name:       DANYELLE JAMES              Room #:         242-P       ADM IN  
M.R.#:      [id number]                       Account #:      [id number]  
Admission:  06/03/21    Attend Phys:    Yan Bob MD      
Discharge:              Date of Birth:  [birth year]  
                                                          Report #: [id number]
                                                                    [id number] 
_______________________________________________________________________________
 
 
Thank you very much for this referral.
 
Michael Bullock MD
PK/LISETH
 
D: 06/04/2021 08:53 PM
T: 06/05/2021 03:18 AM
 
 
 
 
 
 
 
 
 
 
 
 
 
 
 
 
 
 
 
 
 
 
 
 
 
 
 
 
 
 
 
 
 
 
 
  <ELECTRONICALLY SIGNED>
   By: Michael Bullock MD         
  06/10/21     1416
D: 06/04/21 1953                           _____________________________________
T: 06/05/21 0218                           Michael Bullock MD           /nt

## 2021-06-11 VITALS — SYSTOLIC BLOOD PRESSURE: 102 MMHG | DIASTOLIC BLOOD PRESSURE: 42 MMHG

## 2021-06-11 VITALS — DIASTOLIC BLOOD PRESSURE: 47 MMHG | SYSTOLIC BLOOD PRESSURE: 110 MMHG

## 2021-06-11 VITALS — SYSTOLIC BLOOD PRESSURE: 92 MMHG | DIASTOLIC BLOOD PRESSURE: 40 MMHG

## 2021-06-11 VITALS — SYSTOLIC BLOOD PRESSURE: 100 MMHG | DIASTOLIC BLOOD PRESSURE: 41 MMHG

## 2021-06-11 VITALS — SYSTOLIC BLOOD PRESSURE: 120 MMHG | DIASTOLIC BLOOD PRESSURE: 47 MMHG

## 2021-06-11 VITALS — DIASTOLIC BLOOD PRESSURE: 43 MMHG | SYSTOLIC BLOOD PRESSURE: 103 MMHG

## 2021-06-11 VITALS — DIASTOLIC BLOOD PRESSURE: 40 MMHG | SYSTOLIC BLOOD PRESSURE: 99 MMHG

## 2021-06-11 VITALS — DIASTOLIC BLOOD PRESSURE: 41 MMHG | SYSTOLIC BLOOD PRESSURE: 98 MMHG

## 2021-06-11 VITALS — SYSTOLIC BLOOD PRESSURE: 117 MMHG | DIASTOLIC BLOOD PRESSURE: 51 MMHG

## 2021-06-11 VITALS — DIASTOLIC BLOOD PRESSURE: 40 MMHG | SYSTOLIC BLOOD PRESSURE: 101 MMHG

## 2021-06-11 VITALS — DIASTOLIC BLOOD PRESSURE: 42 MMHG | SYSTOLIC BLOOD PRESSURE: 97 MMHG

## 2021-06-11 VITALS — SYSTOLIC BLOOD PRESSURE: 105 MMHG | DIASTOLIC BLOOD PRESSURE: 43 MMHG

## 2021-06-11 VITALS — DIASTOLIC BLOOD PRESSURE: 43 MMHG | SYSTOLIC BLOOD PRESSURE: 104 MMHG

## 2021-06-11 VITALS — DIASTOLIC BLOOD PRESSURE: 39 MMHG | SYSTOLIC BLOOD PRESSURE: 99 MMHG

## 2021-06-11 VITALS — SYSTOLIC BLOOD PRESSURE: 106 MMHG | DIASTOLIC BLOOD PRESSURE: 45 MMHG

## 2021-06-11 VITALS — DIASTOLIC BLOOD PRESSURE: 41 MMHG | SYSTOLIC BLOOD PRESSURE: 103 MMHG

## 2021-06-11 VITALS — DIASTOLIC BLOOD PRESSURE: 41 MMHG | SYSTOLIC BLOOD PRESSURE: 94 MMHG

## 2021-06-11 VITALS — SYSTOLIC BLOOD PRESSURE: 91 MMHG | DIASTOLIC BLOOD PRESSURE: 33 MMHG

## 2021-06-11 VITALS — SYSTOLIC BLOOD PRESSURE: 93 MMHG | DIASTOLIC BLOOD PRESSURE: 39 MMHG

## 2021-06-11 VITALS — DIASTOLIC BLOOD PRESSURE: 36 MMHG | SYSTOLIC BLOOD PRESSURE: 91 MMHG

## 2021-06-11 VITALS — DIASTOLIC BLOOD PRESSURE: 43 MMHG | SYSTOLIC BLOOD PRESSURE: 102 MMHG

## 2021-06-11 VITALS — DIASTOLIC BLOOD PRESSURE: 43 MMHG | SYSTOLIC BLOOD PRESSURE: 101 MMHG

## 2021-06-11 VITALS — SYSTOLIC BLOOD PRESSURE: 99 MMHG | DIASTOLIC BLOOD PRESSURE: 38 MMHG

## 2021-06-11 VITALS — DIASTOLIC BLOOD PRESSURE: 35 MMHG | SYSTOLIC BLOOD PRESSURE: 90 MMHG

## 2021-06-11 VITALS — DIASTOLIC BLOOD PRESSURE: 36 MMHG | SYSTOLIC BLOOD PRESSURE: 92 MMHG

## 2021-06-11 VITALS — SYSTOLIC BLOOD PRESSURE: 98 MMHG | DIASTOLIC BLOOD PRESSURE: 42 MMHG

## 2021-06-11 VITALS — SYSTOLIC BLOOD PRESSURE: 102 MMHG | DIASTOLIC BLOOD PRESSURE: 50 MMHG

## 2021-06-11 VITALS — SYSTOLIC BLOOD PRESSURE: 94 MMHG | DIASTOLIC BLOOD PRESSURE: 45 MMHG

## 2021-06-11 VITALS — SYSTOLIC BLOOD PRESSURE: 99 MMHG | DIASTOLIC BLOOD PRESSURE: 36 MMHG

## 2021-06-11 VITALS — DIASTOLIC BLOOD PRESSURE: 44 MMHG | SYSTOLIC BLOOD PRESSURE: 104 MMHG

## 2021-06-11 VITALS — DIASTOLIC BLOOD PRESSURE: 44 MMHG | SYSTOLIC BLOOD PRESSURE: 110 MMHG

## 2021-06-11 VITALS — DIASTOLIC BLOOD PRESSURE: 73 MMHG | SYSTOLIC BLOOD PRESSURE: 117 MMHG

## 2021-06-11 VITALS — SYSTOLIC BLOOD PRESSURE: 104 MMHG | DIASTOLIC BLOOD PRESSURE: 49 MMHG

## 2021-06-11 VITALS — DIASTOLIC BLOOD PRESSURE: 35 MMHG | SYSTOLIC BLOOD PRESSURE: 88 MMHG

## 2021-06-11 VITALS — SYSTOLIC BLOOD PRESSURE: 92 MMHG | DIASTOLIC BLOOD PRESSURE: 35 MMHG

## 2021-06-11 VITALS — SYSTOLIC BLOOD PRESSURE: 95 MMHG | DIASTOLIC BLOOD PRESSURE: 35 MMHG

## 2021-06-11 VITALS — SYSTOLIC BLOOD PRESSURE: 93 MMHG | DIASTOLIC BLOOD PRESSURE: 40 MMHG

## 2021-06-11 VITALS — DIASTOLIC BLOOD PRESSURE: 42 MMHG | SYSTOLIC BLOOD PRESSURE: 103 MMHG

## 2021-06-11 VITALS — DIASTOLIC BLOOD PRESSURE: 45 MMHG | SYSTOLIC BLOOD PRESSURE: 105 MMHG

## 2021-06-11 VITALS — DIASTOLIC BLOOD PRESSURE: 35 MMHG | SYSTOLIC BLOOD PRESSURE: 89 MMHG

## 2021-06-11 VITALS — DIASTOLIC BLOOD PRESSURE: 35 MMHG | SYSTOLIC BLOOD PRESSURE: 94 MMHG

## 2021-06-11 VITALS — SYSTOLIC BLOOD PRESSURE: 114 MMHG | DIASTOLIC BLOOD PRESSURE: 50 MMHG

## 2021-06-11 VITALS — DIASTOLIC BLOOD PRESSURE: 41 MMHG | SYSTOLIC BLOOD PRESSURE: 107 MMHG

## 2021-06-11 VITALS — SYSTOLIC BLOOD PRESSURE: 98 MMHG | DIASTOLIC BLOOD PRESSURE: 38 MMHG

## 2021-06-11 VITALS — DIASTOLIC BLOOD PRESSURE: 41 MMHG | SYSTOLIC BLOOD PRESSURE: 102 MMHG

## 2021-06-11 VITALS — DIASTOLIC BLOOD PRESSURE: 43 MMHG | SYSTOLIC BLOOD PRESSURE: 99 MMHG

## 2021-06-11 VITALS — SYSTOLIC BLOOD PRESSURE: 96 MMHG | DIASTOLIC BLOOD PRESSURE: 37 MMHG

## 2021-06-11 VITALS — SYSTOLIC BLOOD PRESSURE: 121 MMHG | DIASTOLIC BLOOD PRESSURE: 51 MMHG

## 2021-06-11 VITALS — SYSTOLIC BLOOD PRESSURE: 99 MMHG | DIASTOLIC BLOOD PRESSURE: 39 MMHG

## 2021-06-11 LAB
ALBUMIN SERPL-MCNC: 1.2 G/DL (ref 3.4–5)
ANION GAP SERPL CALC-SCNC: 13 MMOL/L (ref 7–16)
BE(VIVO): -6.5 MMOL/L
BUN SERPL-MCNC: 79 MG/DL (ref 7–18)
CALCIUM SERPL-MCNC: 6.3 MG/DL (ref 8.5–10.1)
CHLORIDE SERPL-SCNC: 100 MMOL/L (ref 98–107)
CO2 SERPL-SCNC: 20 MMOL/L (ref 21–32)
CREAT SERPL-MCNC: 4.7 MG/DL (ref 0.6–1)
ERYTHROCYTE [DISTWIDTH] IN BLOOD BY AUTOMATED COUNT: 14.9 % (ref 10.5–14.5)
GLUCOSE SERPL-MCNC: 161 MG/DL (ref 74–106)
HCO3 BLD-SCNC: 19 MMOL/L (ref 22–26)
HCT VFR BLD CALC: 17.4 % (ref 37–47)
HGB BLD-MCNC: 6 GM/DL (ref 12–15)
MCH RBC QN AUTO: 31.4 PG (ref 26–34)
MCHC RBC AUTO-ENTMCNC: 34.8 G/DL (ref 28–37)
MCV RBC: 90.4 FL (ref 80–100)
PCO2 BLD: 37.7 MMHG (ref 35–45)
PHOSPHATE SERPL-MCNC: 4.5 MG/DL (ref 2.5–4.9)
PLATELET # BLD: 133 THOU/UL (ref 150–400)
PO2 BLD: 98.4 MMHG (ref 80–100)
POTASSIUM SERPL-SCNC: 3.6 MMOL/L (ref 3.5–5.1)
RBC # BLD AUTO: 1.92 MIL/UL (ref 4.2–5)
SODIUM SERPL-SCNC: 133 MMOL/L (ref 136–145)
WBC # BLD AUTO: 11 THOU/UL (ref 4–11)

## 2021-06-11 NOTE — NUR
ASSUMED CARE AT 1900. DURING BATH AROUND 2200, REMOVED CLONIDINE PATCH FROM R
ARM; TURNED LEVO GTT OFF AT 2340. PT'S DTR RUSTAM CALLED AROUND 2340, GAVE HER
BRIEF UPDATE ON CONDITION, NO ACUTE CHANGES TO REPORT AT THAT TIME.
 
MIDNIGHT TEMP WAS LOW, AROUND 94 DEG, PLACED ON BAREHUGGER. MINIMAL SECRETIONS
OVERNIGHT, OCCASIONAL BEN COLOR OUT.
 
0500-CALLED CRITICAL ABG RESULT TO DR. HERMOSILLO, NO ORDERS RECEIVED.
0515-CALLED MIRA BARRON W/CRITICAL H/H RESULT; OBTAINED ORDER TO RE-TYPE AND
SCREEN PT AND TRANSFUSE ONE UNIT OF BLOOD.
NO OTHER CONCERNS AT THIS TIME, WILL CONTINUE TO MONITOR.

## 2021-06-11 NOTE — NUR
Chart review, discussed during los and am rounds. Fausto not here and this
time. Patient cont to require vent support, and nutritional support.
Receiving PRBC. Bedside nurse passed on that daughter was dealing
with fmla. CM provided letter per her daughter fausto for work, left on front
of chart. cm called fausto, no answer. Will cont. following as needed for dc
needs and support.

## 2021-06-11 NOTE — NUR
ONE UNIT OF PRBCS ADMINISTERED. ET TUBE WITHDRAWN 1.5 CM TO NOW 24, CXR SHOWS
FOREIGN METAL BODY OVER MEDIASTINUM, PHYSICIANS AWARE.  DAUGHTER GIVEN LETTER
FROM RN CASE MANAGER FOR WORK REGARDING FMLA REQUEST. PER DR. MIR, NO
PHYSICIANS TO SPEAK TO FAMILY BESIDES HIMSELF, PER DAUGHTER'S REQUEST. NO
OTHER EVENTS THIS SHIFT, WILL CONTINUE TO MONITOR.

## 2021-06-12 VITALS — DIASTOLIC BLOOD PRESSURE: 36 MMHG | SYSTOLIC BLOOD PRESSURE: 89 MMHG

## 2021-06-12 VITALS — SYSTOLIC BLOOD PRESSURE: 100 MMHG | DIASTOLIC BLOOD PRESSURE: 40 MMHG

## 2021-06-12 VITALS — DIASTOLIC BLOOD PRESSURE: 42 MMHG | SYSTOLIC BLOOD PRESSURE: 104 MMHG

## 2021-06-12 VITALS — SYSTOLIC BLOOD PRESSURE: 90 MMHG | DIASTOLIC BLOOD PRESSURE: 35 MMHG

## 2021-06-12 VITALS — SYSTOLIC BLOOD PRESSURE: 106 MMHG | DIASTOLIC BLOOD PRESSURE: 37 MMHG

## 2021-06-12 VITALS — DIASTOLIC BLOOD PRESSURE: 91 MMHG | SYSTOLIC BLOOD PRESSURE: 136 MMHG

## 2021-06-12 VITALS — DIASTOLIC BLOOD PRESSURE: 37 MMHG | SYSTOLIC BLOOD PRESSURE: 94 MMHG

## 2021-06-12 VITALS — SYSTOLIC BLOOD PRESSURE: 91 MMHG | DIASTOLIC BLOOD PRESSURE: 38 MMHG

## 2021-06-12 VITALS — SYSTOLIC BLOOD PRESSURE: 100 MMHG | DIASTOLIC BLOOD PRESSURE: 41 MMHG

## 2021-06-12 VITALS — SYSTOLIC BLOOD PRESSURE: 111 MMHG | DIASTOLIC BLOOD PRESSURE: 45 MMHG

## 2021-06-12 VITALS — SYSTOLIC BLOOD PRESSURE: 91 MMHG | DIASTOLIC BLOOD PRESSURE: 35 MMHG

## 2021-06-12 VITALS — SYSTOLIC BLOOD PRESSURE: 102 MMHG | DIASTOLIC BLOOD PRESSURE: 36 MMHG

## 2021-06-12 VITALS — SYSTOLIC BLOOD PRESSURE: 105 MMHG | DIASTOLIC BLOOD PRESSURE: 43 MMHG

## 2021-06-12 VITALS — DIASTOLIC BLOOD PRESSURE: 38 MMHG | SYSTOLIC BLOOD PRESSURE: 98 MMHG

## 2021-06-12 VITALS — DIASTOLIC BLOOD PRESSURE: 36 MMHG | SYSTOLIC BLOOD PRESSURE: 95 MMHG

## 2021-06-12 VITALS — DIASTOLIC BLOOD PRESSURE: 45 MMHG | SYSTOLIC BLOOD PRESSURE: 111 MMHG

## 2021-06-12 VITALS — DIASTOLIC BLOOD PRESSURE: 41 MMHG | SYSTOLIC BLOOD PRESSURE: 105 MMHG

## 2021-06-12 VITALS — DIASTOLIC BLOOD PRESSURE: 44 MMHG | SYSTOLIC BLOOD PRESSURE: 96 MMHG

## 2021-06-12 VITALS — SYSTOLIC BLOOD PRESSURE: 136 MMHG | DIASTOLIC BLOOD PRESSURE: 50 MMHG

## 2021-06-12 VITALS — DIASTOLIC BLOOD PRESSURE: 35 MMHG | SYSTOLIC BLOOD PRESSURE: 88 MMHG

## 2021-06-12 VITALS — DIASTOLIC BLOOD PRESSURE: 35 MMHG | SYSTOLIC BLOOD PRESSURE: 86 MMHG

## 2021-06-12 VITALS — SYSTOLIC BLOOD PRESSURE: 108 MMHG | DIASTOLIC BLOOD PRESSURE: 45 MMHG

## 2021-06-12 VITALS — SYSTOLIC BLOOD PRESSURE: 112 MMHG | DIASTOLIC BLOOD PRESSURE: 40 MMHG

## 2021-06-12 VITALS — SYSTOLIC BLOOD PRESSURE: 89 MMHG | DIASTOLIC BLOOD PRESSURE: 33 MMHG

## 2021-06-12 VITALS — SYSTOLIC BLOOD PRESSURE: 95 MMHG | DIASTOLIC BLOOD PRESSURE: 41 MMHG

## 2021-06-12 VITALS — DIASTOLIC BLOOD PRESSURE: 34 MMHG | SYSTOLIC BLOOD PRESSURE: 90 MMHG

## 2021-06-12 VITALS — SYSTOLIC BLOOD PRESSURE: 100 MMHG | DIASTOLIC BLOOD PRESSURE: 39 MMHG

## 2021-06-12 VITALS — DIASTOLIC BLOOD PRESSURE: 48 MMHG | SYSTOLIC BLOOD PRESSURE: 111 MMHG

## 2021-06-12 VITALS — DIASTOLIC BLOOD PRESSURE: 38 MMHG | SYSTOLIC BLOOD PRESSURE: 91 MMHG

## 2021-06-12 VITALS — DIASTOLIC BLOOD PRESSURE: 57 MMHG | SYSTOLIC BLOOD PRESSURE: 145 MMHG

## 2021-06-12 VITALS — SYSTOLIC BLOOD PRESSURE: 113 MMHG | DIASTOLIC BLOOD PRESSURE: 47 MMHG

## 2021-06-12 VITALS — DIASTOLIC BLOOD PRESSURE: 39 MMHG | SYSTOLIC BLOOD PRESSURE: 99 MMHG

## 2021-06-12 VITALS — DIASTOLIC BLOOD PRESSURE: 39 MMHG | SYSTOLIC BLOOD PRESSURE: 94 MMHG

## 2021-06-12 VITALS — SYSTOLIC BLOOD PRESSURE: 100 MMHG | DIASTOLIC BLOOD PRESSURE: 42 MMHG

## 2021-06-12 VITALS — DIASTOLIC BLOOD PRESSURE: 35 MMHG | SYSTOLIC BLOOD PRESSURE: 94 MMHG

## 2021-06-12 VITALS — DIASTOLIC BLOOD PRESSURE: 43 MMHG | SYSTOLIC BLOOD PRESSURE: 123 MMHG

## 2021-06-12 VITALS — DIASTOLIC BLOOD PRESSURE: 37 MMHG | SYSTOLIC BLOOD PRESSURE: 93 MMHG

## 2021-06-12 VITALS — DIASTOLIC BLOOD PRESSURE: 49 MMHG | SYSTOLIC BLOOD PRESSURE: 125 MMHG

## 2021-06-12 VITALS — SYSTOLIC BLOOD PRESSURE: 99 MMHG | DIASTOLIC BLOOD PRESSURE: 41 MMHG

## 2021-06-12 VITALS — SYSTOLIC BLOOD PRESSURE: 94 MMHG | DIASTOLIC BLOOD PRESSURE: 37 MMHG

## 2021-06-12 VITALS — DIASTOLIC BLOOD PRESSURE: 39 MMHG | SYSTOLIC BLOOD PRESSURE: 95 MMHG

## 2021-06-12 VITALS — SYSTOLIC BLOOD PRESSURE: 131 MMHG | DIASTOLIC BLOOD PRESSURE: 44 MMHG

## 2021-06-12 VITALS — DIASTOLIC BLOOD PRESSURE: 36 MMHG | SYSTOLIC BLOOD PRESSURE: 88 MMHG

## 2021-06-12 VITALS — SYSTOLIC BLOOD PRESSURE: 88 MMHG | DIASTOLIC BLOOD PRESSURE: 33 MMHG

## 2021-06-12 VITALS — SYSTOLIC BLOOD PRESSURE: 107 MMHG | DIASTOLIC BLOOD PRESSURE: 39 MMHG

## 2021-06-12 LAB
ALBUMIN SERPL-MCNC: 1.1 G/DL (ref 3.4–5)
ANION GAP SERPL CALC-SCNC: 12 MMOL/L (ref 7–16)
BUN SERPL-MCNC: 84 MG/DL (ref 7–18)
CALCIUM SERPL-MCNC: 6.3 MG/DL (ref 8.5–10.1)
CHLORIDE SERPL-SCNC: 100 MMOL/L (ref 98–107)
CO2 SERPL-SCNC: 21 MMOL/L (ref 21–32)
CREAT SERPL-MCNC: 5.1 MG/DL (ref 0.6–1)
GLUCOSE SERPL-MCNC: 178 MG/DL (ref 74–106)
PHOSPHATE SERPL-MCNC: 4.9 MG/DL (ref 2.6–4.7)
POTASSIUM SERPL-SCNC: 3.7 MMOL/L (ref 3.5–5.1)
SODIUM SERPL-SCNC: 133 MMOL/L (ref 136–145)

## 2021-06-12 NOTE — NUR
ASSUMED CARE AT 1900. AFEBRILE OVERNIGHT. BP SOFT W/MAPs IN 60'S, HELD HS
METOPROLOL. AFTER MIDNIGHT, PT DIDN'T TOLERATE TURNS TO LEFT SIDE, WOULD DESAT
IN MID 80'S, IMPROVED ONCE BACK ON RIGHT SIDE. MINIMAL SECRETIONS FROM ETT. NO
OTHER CONCERNS, WILL CONTINUE TO MONITOR.

## 2021-06-12 NOTE — NUR
PATIENT HAD BEEN HYPOTHERMIC AT BEGINNING OF SHIFT, BEAR HUGGER WAS PLACED.
BEAR HUGGER WAS REMOVED AT END OF SHIFT WHEN PATIENT WAS NORMOTHERMIC.  LASIX
GTT WAS DISCONTINUED TODAY. VITALS REMAINED HEMODYNAMICALLY STABLE. FAMILY WAS
AT BEDSIDE. WILL CONTINUE TO MONITOR.

## 2021-06-13 VITALS — DIASTOLIC BLOOD PRESSURE: 40 MMHG | SYSTOLIC BLOOD PRESSURE: 103 MMHG

## 2023-03-29 NOTE — NUR
Pt c/o cough, sneezing, congestion, mild h/a x4 days. No fever, sore throat, or ear pain. Has not tried any OTC remedies.    Past Medical History:   Diagnosis Date    Allergy     COPD (chronic obstructive pulmonary disease)     Diabetes mellitus, type 2     Hyperlipidemia     Hypertension     Morbid obesity      Review of Systems   Constitutional:  Negative for chills and fever.   HENT:  Positive for congestion. Negative for ear pain, nosebleeds and sore throat.    Respiratory:  Positive for cough. Negative for shortness of breath.    Cardiovascular:  Negative for chest pain.   Gastrointestinal:  Negative for abdominal pain, diarrhea, nausea and vomiting.   Skin:  Negative for rash.   Neurological:  Positive for headaches. Negative for weakness.     Physical Exam  Vitals reviewed. Exam conducted with a chaperone present.   Constitutional:       General: She is not in acute distress.     Appearance: Normal appearance.   HENT:      Head: Normocephalic.      Mouth/Throat:      Mouth: Mucous membranes are moist.      Pharynx: Oropharynx is clear.   Eyes:      General: No scleral icterus.     Pupils: Pupils are equal, round, and reactive to light.   Cardiovascular:      Rate and Rhythm: Normal rate.   Pulmonary:      Effort: Pulmonary effort is normal. No respiratory distress.      Breath sounds: Normal breath sounds.   Abdominal:      General: There is no distension.      Palpations: Abdomen is soft.      Tenderness: There is no abdominal tenderness. There is no guarding or rebound.   Skin:     General: Skin is warm and dry.   Neurological:      General: No focal deficit present.      Mental Status: She is alert and oriented to person, place, and time. Mental status is at baseline.   Psychiatric:         Mood and Affect: Mood normal.         Behavior: Behavior normal.         Thought Content: Thought content normal.         Judgment: Judgment normal.     Plan  -Claritin  -Flonase - pt states already has some at  SPOKE WITH DR MARTINEZ VIA PHONE AT 1430 AND UPDATED HIM ON THE PATIENT'S LAB
VALULES AND HBG LESS THAN 7.  ORDERS NOTED.  DR DE LA GARZA CALLED IN AND UPDATED ON
PATIENT STATUS.  ORDER NOTED.  FAMILY UPDATED ON THE POC. REASSURANCE GIVEN.
DR MARTINEZ ALSO UPDATED ON THE NEURO STATUS. home  -pt requests shot but does not want steroids due to DM, also does not want Toradol   -RTC PRN
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
